# Patient Record
Sex: MALE | Race: NATIVE HAWAIIAN OR OTHER PACIFIC ISLANDER | NOT HISPANIC OR LATINO | Employment: FULL TIME | URBAN - METROPOLITAN AREA
[De-identification: names, ages, dates, MRNs, and addresses within clinical notes are randomized per-mention and may not be internally consistent; named-entity substitution may affect disease eponyms.]

---

## 2018-12-28 ENCOUNTER — APPOINTMENT (OUTPATIENT)
Dept: CT IMAGING | Facility: CLINIC | Age: 58
End: 2018-12-28
Attending: EMERGENCY MEDICINE
Payer: COMMERCIAL

## 2018-12-28 ENCOUNTER — APPOINTMENT (OUTPATIENT)
Dept: GENERAL RADIOLOGY | Facility: CLINIC | Age: 58
End: 2018-12-28
Attending: EMERGENCY MEDICINE
Payer: COMMERCIAL

## 2018-12-28 ENCOUNTER — HOSPITAL ENCOUNTER (EMERGENCY)
Facility: CLINIC | Age: 58
Discharge: HOME | End: 2018-12-28
Attending: EMERGENCY MEDICINE | Admitting: EMERGENCY MEDICINE
Payer: COMMERCIAL

## 2018-12-28 VITALS
HEIGHT: 71 IN | OXYGEN SATURATION: 95 % | BODY MASS INDEX: 44.1 KG/M2 | WEIGHT: 315 LBS | RESPIRATION RATE: 24 BRPM | HEART RATE: 105 BPM | TEMPERATURE: 99.2 F | DIASTOLIC BLOOD PRESSURE: 80 MMHG | SYSTOLIC BLOOD PRESSURE: 115 MMHG

## 2018-12-28 DIAGNOSIS — R20.2 PARESTHESIAS: ICD-10-CM

## 2018-12-28 DIAGNOSIS — N30.00 ACUTE CYSTITIS WITHOUT HEMATURIA: ICD-10-CM

## 2018-12-28 DIAGNOSIS — R06.02 SHORTNESS OF BREATH: ICD-10-CM

## 2018-12-28 DIAGNOSIS — G93.0 ARACHNOID CYST: ICD-10-CM

## 2018-12-28 DIAGNOSIS — R06.00 DYSPNEA, UNSPECIFIED TYPE: ICD-10-CM

## 2018-12-28 LAB
ALBUMIN SERPL-MCNC: 3.5 G/DL (ref 3.4–5)
ALBUMIN UR-MCNC: 30 MG/DL
ALP SERPL-CCNC: 96 U/L (ref 40–150)
ALT SERPL W P-5'-P-CCNC: 41 U/L (ref 0–70)
ANION GAP SERPL CALCULATED.3IONS-SCNC: 9 MMOL/L (ref 3–14)
APPEARANCE UR: ABNORMAL
AST SERPL W P-5'-P-CCNC: 24 U/L (ref 0–45)
BACTERIA #/AREA URNS HPF: ABNORMAL /HPF
BASOPHILS # BLD AUTO: 0 10E9/L (ref 0–0.2)
BASOPHILS NFR BLD AUTO: 0.1 %
BILIRUB SERPL-MCNC: 0.8 MG/DL (ref 0.2–1.3)
BILIRUB UR QL STRIP: NEGATIVE
BUN SERPL-MCNC: 12 MG/DL (ref 7–30)
CALCIUM SERPL-MCNC: 8.6 MG/DL (ref 8.5–10.1)
CHLORIDE SERPL-SCNC: 103 MMOL/L (ref 94–109)
CO2 SERPL-SCNC: 26 MMOL/L (ref 20–32)
COLOR UR AUTO: YELLOW
CREAT SERPL-MCNC: 0.97 MG/DL (ref 0.66–1.25)
DIFFERENTIAL METHOD BLD: ABNORMAL
EOSINOPHIL # BLD AUTO: 0 10E9/L (ref 0–0.7)
EOSINOPHIL NFR BLD AUTO: 0.2 %
ERYTHROCYTE [DISTWIDTH] IN BLOOD BY AUTOMATED COUNT: 13.5 % (ref 10–15)
GFR SERPL CREATININE-BSD FRML MDRD: 86 ML/MIN/{1.73_M2}
GLUCOSE SERPL-MCNC: 95 MG/DL (ref 70–99)
GLUCOSE UR STRIP-MCNC: NEGATIVE MG/DL
HCT VFR BLD AUTO: 41 % (ref 40–53)
HGB BLD-MCNC: 14.4 G/DL (ref 13.3–17.7)
HGB UR QL STRIP: NEGATIVE
IMM GRANULOCYTES # BLD: 0 10E9/L (ref 0–0.4)
IMM GRANULOCYTES NFR BLD: 0.3 %
INTERPRETATION ECG - MUSE: NORMAL
KETONES UR STRIP-MCNC: NEGATIVE MG/DL
LEUKOCYTE ESTERASE UR QL STRIP: ABNORMAL
LYMPHOCYTES # BLD AUTO: 1.8 10E9/L (ref 0.8–5.3)
LYMPHOCYTES NFR BLD AUTO: 11.3 %
MCH RBC QN AUTO: 31.2 PG (ref 26.5–33)
MCHC RBC AUTO-ENTMCNC: 35.1 G/DL (ref 31.5–36.5)
MCV RBC AUTO: 89 FL (ref 78–100)
MONOCYTES # BLD AUTO: 1.3 10E9/L (ref 0–1.3)
MONOCYTES NFR BLD AUTO: 8.1 %
MUCOUS THREADS #/AREA URNS LPF: PRESENT /LPF
NEUTROPHILS # BLD AUTO: 12.6 10E9/L (ref 1.6–8.3)
NEUTROPHILS NFR BLD AUTO: 80 %
NITRATE UR QL: NEGATIVE
NRBC # BLD AUTO: 0 10*3/UL
NRBC BLD AUTO-RTO: 0 /100
PH UR STRIP: 7 PH (ref 5–7)
PLATELET # BLD AUTO: 232 10E9/L (ref 150–450)
POTASSIUM SERPL-SCNC: 3.7 MMOL/L (ref 3.4–5.3)
PROT SERPL-MCNC: 8.1 G/DL (ref 6.8–8.8)
RBC # BLD AUTO: 4.62 10E12/L (ref 4.4–5.9)
RBC #/AREA URNS AUTO: 4 /HPF (ref 0–2)
SODIUM SERPL-SCNC: 138 MMOL/L (ref 133–144)
SOURCE: ABNORMAL
SP GR UR STRIP: 1.02 (ref 1–1.03)
TROPONIN I SERPL-MCNC: <0.015 UG/L (ref 0–0.04)
UROBILINOGEN UR STRIP-MCNC: NORMAL MG/DL (ref 0–2)
WBC # BLD AUTO: 15.7 10E9/L (ref 4–11)
WBC #/AREA URNS AUTO: 104 /HPF (ref 0–5)

## 2018-12-28 PROCEDURE — 84484 ASSAY OF TROPONIN QUANT: CPT | Performed by: EMERGENCY MEDICINE

## 2018-12-28 PROCEDURE — 85025 COMPLETE CBC W/AUTO DIFF WBC: CPT | Performed by: EMERGENCY MEDICINE

## 2018-12-28 PROCEDURE — 25000128 H RX IP 250 OP 636: Performed by: EMERGENCY MEDICINE

## 2018-12-28 PROCEDURE — 70450 CT HEAD/BRAIN W/O DYE: CPT

## 2018-12-28 PROCEDURE — 93005 ELECTROCARDIOGRAM TRACING: CPT

## 2018-12-28 PROCEDURE — 80053 COMPREHEN METABOLIC PANEL: CPT | Performed by: EMERGENCY MEDICINE

## 2018-12-28 PROCEDURE — 71046 X-RAY EXAM CHEST 2 VIEWS: CPT

## 2018-12-28 PROCEDURE — 99285 EMERGENCY DEPT VISIT HI MDM: CPT | Mod: 25

## 2018-12-28 PROCEDURE — 81001 URINALYSIS AUTO W/SCOPE: CPT | Performed by: EMERGENCY MEDICINE

## 2018-12-28 PROCEDURE — 96365 THER/PROPH/DIAG IV INF INIT: CPT

## 2018-12-28 RX ORDER — CEFTRIAXONE 1 G/1
1 INJECTION, POWDER, FOR SOLUTION INTRAMUSCULAR; INTRAVENOUS ONCE
Status: COMPLETED | OUTPATIENT
Start: 2018-12-28 | End: 2018-12-28

## 2018-12-28 RX ORDER — CLOTRIMAZOLE 1 %
CREAM (GRAM) TOPICAL 2 TIMES DAILY
Qty: 45 G | Refills: 0 | Status: SHIPPED | OUTPATIENT
Start: 2018-12-28 | End: 2019-01-12

## 2018-12-28 RX ORDER — CEPHALEXIN 500 MG/1
500 CAPSULE ORAL 2 TIMES DAILY
Qty: 14 CAPSULE | Refills: 0 | Status: SHIPPED | OUTPATIENT
Start: 2018-12-28 | End: 2019-01-04

## 2018-12-28 RX ADMIN — CEFTRIAXONE 1 G: 1 INJECTION, POWDER, FOR SOLUTION INTRAMUSCULAR; INTRAVENOUS at 16:11

## 2018-12-28 SDOH — HEALTH STABILITY: MENTAL HEALTH: HOW OFTEN DO YOU HAVE A DRINK CONTAINING ALCOHOL?: NEVER

## 2018-12-28 ASSESSMENT — MIFFLIN-ST. JEOR: SCORE: 2293.64

## 2018-12-28 ASSESSMENT — ENCOUNTER SYMPTOMS
HEADACHES: 0
DIZZINESS: 1
FEVER: 0
NUMBNESS: 1
LIGHT-HEADEDNESS: 1
SHORTNESS OF BREATH: 1
PALPITATIONS: 1

## 2018-12-28 NOTE — ED PROVIDER NOTES
History     Chief Complaint:  Shortness of Breath     HPI   Som Mathews is a 58 year old male with no known past medical history who presents to the emergency department with palpitations and shortness of breath. The patient just moved here in the last year from [place] and has not yet established primary care here in [place]. The patient has not undergone an annual health check up in some time and is not on any regular medications. He has a history of sleep apnea, but no other diagnosed concerns. More recently, however, the patient is concerned as he has had numbness and tingling into his left hand, which is worse when he plays guitar. The patient is right handed. The tingling also extends intermittently into his lower left foot as well. This morning, then, he awoke feeling short of breath and as though his heart was racing. He did not have chest pain and denies cough. He also notes urinary frequency today without dysuria, hematuria, abdominal/back pain. Throughout the morning, the patient continued to have this sensation, and furthermore felt dizzy and lightheaded. He has not had headaches. The patient notes that he had palpitations several years ago related to stress, and that he has become more anxious lately due to the stress of his work. He otherwise denies depression or sleep disturbance, but has been sleeping for long hours and at odd times. No acute weight changes, but he has been gaining weight steadily over the past 2 years.     Allergies:  No known drug allergies    Medications:    The patient is not currently taking any prescribed medications.    Past Medical History:    Sleep apnea     Past Surgical History:    History reviewed. No pertinent surgical history.    Family History:    History reviewed. No pertinent family history.     Social History:  The patient was accompanied to the ED by his wife.  Smoking Status: Never  Smokeless Tobacco: Never  Alcohol Use: No  Marital Status:  Single [1]  "    Review of Systems   Constitutional: Negative for fever.   Respiratory: Positive for shortness of breath.    Cardiovascular: Positive for palpitations. Negative for chest pain.   Skin:        Chronic \"jock itch\"   Neurological: Positive for dizziness, light-headedness and numbness. Negative for headaches.   All other systems reviewed and are negative.    Physical Exam     Patient Vitals for the past 24 hrs:   BP Temp Temp src Pulse Heart Rate Resp SpO2 Height Weight   12/28/18 1612 -- -- -- -- 111 24 95 % -- --   12/28/18 1611 -- -- -- -- 103 24 96 % -- --   12/28/18 1610 -- -- -- -- 105 27 95 % -- --   12/28/18 1609 -- -- -- -- 104 25 95 % -- --   12/28/18 1608 -- -- -- -- 105 25 95 % -- --   12/28/18 1607 -- -- -- -- 104 22 95 % -- --   12/28/18 1606 -- -- -- -- 105 23 96 % -- --   12/28/18 1605 -- -- -- -- 105 24 94 % -- --   12/28/18 1604 -- -- -- -- 105 23 95 % -- --   12/28/18 1603 -- -- -- -- 104 24 95 % -- --   12/28/18 1602 -- -- -- -- 105 24 94 % -- --   12/28/18 1601 -- -- -- -- 105 27 93 % -- --   12/28/18 1600 -- -- -- -- 108 26 93 % -- --   12/28/18 1559 -- -- -- -- 107 26 94 % -- --   12/28/18 1558 -- -- -- -- 107 20 94 % -- --   12/28/18 1557 -- -- -- -- 112 28 95 % -- --   12/28/18 1556 -- -- -- -- 114 21 96 % -- --   12/28/18 1555 -- -- -- -- 110 27 94 % -- --   12/28/18 1554 -- -- -- -- 110 28 95 % -- --   12/28/18 1553 -- -- -- -- 112 29 96 % -- --   12/28/18 1552 -- -- -- -- 112 16 94 % -- --   12/28/18 1551 -- -- -- -- 109 26 95 % -- --   12/28/18 1550 -- -- -- -- 111 18 93 % -- --   12/28/18 1549 -- -- -- -- 105 24 93 % -- --   12/28/18 1548 -- -- -- -- 108 23 93 % -- --   12/28/18 1547 -- -- -- -- 109 26 94 % -- --   12/28/18 1546 -- -- -- -- 107 24 94 % -- --   12/28/18 1545 -- -- -- -- 109 25 94 % -- --   12/28/18 1544 -- -- -- -- 107 24 94 % -- --   12/28/18 1543 -- -- -- -- 108 25 94 % -- --   12/28/18 1542 -- -- -- -- 109 25 94 % -- --   12/28/18 1541 -- -- -- -- 109 25 94 % -- -- "   12/28/18 1540 -- -- -- -- 107 25 94 % -- --   12/28/18 1539 -- -- -- -- 106 25 94 % -- --   12/28/18 1538 -- -- -- -- 106 24 94 % -- --   12/28/18 1537 -- -- -- -- 110 25 93 % -- --   12/28/18 1536 -- -- -- -- 105 24 94 % -- --   12/28/18 1535 -- -- -- -- 105 24 93 % -- --   12/28/18 1534 -- -- -- -- 105 23 94 % -- --   12/28/18 1533 -- -- -- -- 111 26 94 % -- --   12/28/18 1532 -- -- -- -- 105 24 94 % -- --   12/28/18 1531 -- -- -- -- 108 25 93 % -- --   12/28/18 1530 -- -- -- -- 106 25 94 % -- --   12/28/18 1529 -- -- -- -- 107 25 94 % -- --   12/28/18 1528 -- -- -- -- 108 27 94 % -- --   12/28/18 1527 -- -- -- -- 107 26 95 % -- --   12/28/18 1526 -- -- -- -- 108 27 95 % -- --   12/28/18 1525 -- -- -- -- 113 (!) 36 -- -- --   12/28/18 1413 -- -- -- -- 104 23 96 % -- --   12/28/18 1412 -- -- -- -- 104 23 95 % -- --   12/28/18 1411 -- -- -- -- 104 23 96 % -- --   12/28/18 1410 -- -- -- -- 103 24 97 % -- --   12/28/18 1409 -- -- -- -- 105 21 95 % -- --   12/28/18 1408 -- -- -- -- 108 24 95 % -- --   12/28/18 1407 -- -- -- -- 105 24 95 % -- --   12/28/18 1406 -- -- -- -- 106 24 95 % -- --   12/28/18 1405 -- -- -- -- 105 22 95 % -- --   12/28/18 1404 -- -- -- -- 107 23 95 % -- --   12/28/18 1403 -- -- -- -- 106 24 96 % -- --   12/28/18 1402 -- -- -- -- 109 16 97 % -- --   12/28/18 1401 -- -- -- -- 110 (!) 34 96 % -- --   12/28/18 1400 115/80 -- -- 105 104 25 98 % -- --   12/28/18 1359 -- -- -- -- 112 19 96 % -- --   12/28/18 1358 -- -- -- -- 104 22 96 % -- --   12/28/18 1357 -- -- -- -- 106 22 96 % -- --   12/28/18 1356 -- -- -- -- 110 27 95 % -- --   12/28/18 1355 -- -- -- -- 107 25 95 % -- --   12/28/18 1354 122/74 -- -- 103 102 25 95 % -- --   12/28/18 1353 -- -- -- -- 104 22 97 % -- --   12/28/18 1352 -- -- -- -- 105 21 95 % -- --   12/28/18 1351 -- -- -- -- 107 25 96 % -- --   12/28/18 1350 -- -- -- -- 105 23 95 % -- --   12/28/18 1349 -- -- -- -- 104 26 96 % -- --   12/28/18 1348 -- -- -- -- 105 24 96 % -- --  "  12/28/18 1347 -- -- -- -- 105 26 96 % -- --   12/28/18 1346 -- -- -- -- 105 26 95 % -- --   12/28/18 1345 -- -- -- -- 106 19 95 % -- --   12/28/18 1344 -- -- -- -- 105 23 97 % -- --   12/28/18 1343 -- -- -- -- 107 20 96 % -- --   12/28/18 1342 -- -- -- -- 105 24 96 % -- --   12/28/18 1341 118/81 99.2  F (37.3  C) Oral 104 105 20 96 % 1.803 m (5' 11\") 145.2 kg (320 lb)   12/28/18 1340 -- -- -- -- 105 13 96 % -- --   12/28/18 1339 -- -- -- -- 105 18 96 % -- --   12/28/18 1338 -- -- -- -- 106 27 95 % -- --   12/28/18 1337 -- -- -- -- 108 22 96 % -- --   12/28/18 1336 -- -- -- -- -- 27 96 % -- --   12/28/18 1335 -- -- -- -- -- -- 97 % -- --   12/28/18 1334 -- -- -- -- -- -- 97 % -- --   12/28/18 1333 118/81 -- -- 106 -- -- -- -- --       Physical Exam    Physical Exam   Constitutional:  Patient is oriented to person, place, and time. They appear well-developed and well-nourished.   HENT:   Mouth/Throat:   Oropharynx is clear and moist.   Eyes:    Conjunctivae normal and EOM are normal. Pupils are equal, round, and reactive to light.   Neck:    Normal range of motion.   Cardiovascular: Normal rate, regular rhythm and normal heart sounds.  Exam reveals no gallop and no friction rub.  No murmur heard.  Pulmonary/Chest:  Effort normal and breath sounds normal. Patient has no wheezes. Patient has no rales.   Abdominal:   High BMI. Soft. Bowel sounds are normal. Patient exhibits no mass. There is no tenderness. There is no rebound and no guarding.   Musculoskeletal:  Normal range of motion. Patient exhibits no edema.   Neurological:   Patient is alert and oriented to person, place, and time. Patient has normal strength. No cranial nerve deficit or sensory deficit. GCS 15. No sensory deficits to soft touch.  Skin:   Skin is warm and dry. No rash noted. No erythema. hyperpigmentation in the groin area, no erythema, pustules, or cellulitis.  Psychiatric:   Patient has a normal mood and affect. Patient's behavior is normal. " Judgment and thought content normal.     Emergency Department Course     Imaging:  Radiology findings were communicated with the patient who voiced understanding of the findings.    Head CT w/o Contrast:  IMPRESSION: Arachnoid cyst above the left superior frontal gyrus.  Otherwise normal CT scan of the head.      FAHEEM HUGHES MD    Chest XR, PA & LAT:  IMPRESSION: No acute disease.    JUDITH ENCARNACION MD    Laboratory:  Laboratory findings were communicated with the patient who voiced understanding of the findings.    CBC: WBC 15.7 (H), o/w WNL (HGB 14.4, )  CMP: (Creatinine 0.97)    Troponin (Collected 1345): <0.015    UA: Albumin 30, Leukocyte esterase large,  (H), RBC 4 (H), bacteria moderate, mucous present, o/w Negative    Interventions:  1611 - Rocephin 1 g vial to  mL IV    Emergency Department Course:  Nursing notes and vitals reviewed.    1452: I performed an exam of the patient as documented above.     Findings and plan explained to the Patient. Patient discharged home with instructions regarding supportive care, medications, and reasons to return. The importance of close follow-up was reviewed.     Impression & Plan      Medical Decision Making:  Paulino Mathews is a 58-year-old gentleman presenting with some ongoing symptoms that have been going on for a month or 2.  He has had some dyspnea but no fevers or cough.  He also has experienced palpitations and some numbness in his left fingertips worse when he plays to place guitar and some tingling in his left second and third toes.  He has no focal weakness.  He has no headache.  His physical exam showed mild tachycardia but otherwise no abdominal pain, CVA tenderness, crackles, respiratory distress, cellulitis.  He did comes to complain of some chronic jock itch for years but there is no acute cellulitis, Clari's, scrotal swelling.     Diagnostic eval was performed.  EKG shows he has mild tachycardia but no evidence of ischemia.  His  cardiac enzymes within normal limits.  Chest x-ray shows no evidence of pneumonia or CHF, masses, or free air.  I did a CT of his head due to his complaints of paresthesias there is an arachnoid cyst there he has never had imaging of his head before.  I suspect that this is more of an incidental finding as these are quite benign. I suspect his paresthesias are due to playing guEchographr.      Urine analysis was performed is he complains of some urinary frequency today.  This does show a urinary tract infection for which he received a dose of Rocephin here.  He has no CVA tenderness, abdominal pain, suprapubic pain, or flank pain.  I doubt therefore pyelonephritis or kidney stone.  With no abdominal pain I doubt any intra-abdominal infection.    At this time I feel he is safe for discharge he is able to tolerate p.o. I see no acute findings for his shortness of breath or palpitations.  Specifically no arrhythmias.  I doubt PE, pericarditis or myocarditis given lack of symptomatology and diagnostic test today.  I do think he needs to establish care, and needs further evaluation of his symptoms, but not emergently so.  I write him for Keflex, clotrimazole for his chronic candidiasis, he will follow-up with primary care as he has multiple chronic issues, referral was given.    Diagnosis:    ICD-10-CM    1. Dyspnea, unspecified type R06.00    2. Paresthesias R20.2    3. Acute cystitis without hematuria N30.00    4. Shortness of breath R06.02    5. Arachnoid cyst G93.0        Disposition:  discharged to home    Discharge Medications:     Medication List      Started    cephALEXin 500 MG capsule  Commonly known as:  KEFLEX  500 mg, Oral, 2 TIMES DAILY     clotrimazole 1 % external cream  Commonly known as:  LOTRIMIN  Topical, 2 TIMES DAILY            Consuelo Davidson  12/28/2018    EMERGENCY DEPARTMENT  I, Consuelo Davidson, am serving as a scribe at 2:54 PM on 12/28/2018 to document services personally performed by Froy  Padma Meyer MD based on my observations and the provider's statements to me.       Padma Mcduffie MD  01/02/19 [id number]

## 2018-12-28 NOTE — ED NOTES
Bed: ED10  Expected date:   Expected time:   Means of arrival:   Comments:  431  58 M weakness/sob/L sided numbness  1321

## 2018-12-28 NOTE — ED AVS SNAPSHOT
Emergency Department  [address] [place] [address]  Phone:  [phone]  Fax:  [phone]                                    Som Mathews   MRN: [id number]    Department:   Emergency Department   Date of Visit:  12/28/2018           After Visit Summary Signature Page    I have received my discharge instructions, and my questions have been answered. I have discussed any challenges I see with this plan with the nurse or doctor.    ..........................................................................................................................................  Patient/Patient Representative Signature      ..........................................................................................................................................  Patient Representative Print Name and Relationship to Patient    ..................................................               ................................................  Date                                   Time    ..........................................................................................................................................  Reviewed by Signature/Title    ...................................................              ..............................................  Date                                               Time          22EPIC Rev 08/18

## 2019-03-18 ENCOUNTER — TELEPHONE (OUTPATIENT)
Dept: OTHER | Facility: CLINIC | Age: 59
End: 2019-03-18

## 2019-09-03 ENCOUNTER — OFFICE VISIT - HEALTHEAST (OUTPATIENT)
Dept: FAMILY MEDICINE | Facility: CLINIC | Age: 59
End: 2019-09-03

## 2019-09-03 DIAGNOSIS — R20.2 INTERMITTENT TINGLING SENSATION OF LEFT HAND AND FOOT: ICD-10-CM

## 2019-09-03 DIAGNOSIS — Z13.1 ENCOUNTER FOR SCREENING FOR DIABETES MELLITUS: ICD-10-CM

## 2019-09-03 DIAGNOSIS — H91.92 HEARING LOSS OF LEFT EAR, UNSPECIFIED HEARING LOSS TYPE: ICD-10-CM

## 2019-09-03 DIAGNOSIS — K42.9 UMBILICAL HERNIA WITHOUT OBSTRUCTION AND WITHOUT GANGRENE: ICD-10-CM

## 2019-09-03 DIAGNOSIS — Z12.11 SCREEN FOR COLON CANCER: ICD-10-CM

## 2019-09-03 DIAGNOSIS — M76.62 ACHILLES TENDINITIS OF LEFT LOWER EXTREMITY: ICD-10-CM

## 2019-09-03 DIAGNOSIS — Z80.0 FAMILY HISTORY OF COLON CANCER: ICD-10-CM

## 2019-09-03 DIAGNOSIS — E66.01 OBESITY, CLASS III, BMI 40-49.9 (MORBID OBESITY) (H): ICD-10-CM

## 2019-09-03 DIAGNOSIS — Z13.220 ENCOUNTER FOR SCREENING FOR LIPOID DISORDERS: ICD-10-CM

## 2019-09-03 DIAGNOSIS — Z00.01 ENCOUNTER FOR GENERAL ADULT MEDICAL EXAMINATION WITH ABNORMAL FINDINGS: ICD-10-CM

## 2019-09-03 LAB
CHOLEST SERPL-MCNC: 178 MG/DL
FASTING STATUS PATIENT QL REPORTED: YES
FASTING STATUS PATIENT QL REPORTED: YES
GLUCOSE BLD-MCNC: 81 MG/DL (ref 70–99)
HDLC SERPL-MCNC: 48 MG/DL
HIV 1+2 AB+HIV1 P24 AG SERPL QL IA: NEGATIVE
LDLC SERPL CALC-MCNC: 112 MG/DL
TRIGL SERPL-MCNC: 90 MG/DL

## 2019-09-03 ASSESSMENT — MIFFLIN-ST. JEOR: SCORE: 2234.02

## 2019-09-04 LAB — HCV AB SERPL QL IA: NEGATIVE

## 2019-11-11 ENCOUNTER — RECORDS - HEALTHEAST (OUTPATIENT)
Dept: ADMINISTRATIVE | Facility: OTHER | Age: 59
End: 2019-11-11

## 2020-07-22 ENCOUNTER — HOSPITAL ENCOUNTER (EMERGENCY)
Facility: CLINIC | Age: 60
Discharge: HOME | End: 2020-07-22
Attending: INTERNAL MEDICINE | Admitting: INTERNAL MEDICINE
Payer: COMMERCIAL

## 2020-07-22 ENCOUNTER — RECORDS - HEALTHEAST (OUTPATIENT)
Dept: ADMINISTRATIVE | Facility: OTHER | Age: 60
End: 2020-07-22

## 2020-07-22 VITALS
DIASTOLIC BLOOD PRESSURE: 76 MMHG | OXYGEN SATURATION: 94 % | RESPIRATION RATE: 16 BRPM | TEMPERATURE: 98 F | SYSTOLIC BLOOD PRESSURE: 120 MMHG

## 2020-07-22 DIAGNOSIS — S61.215A LACERATION OF LEFT RING FINGER WITHOUT FOREIGN BODY, NAIL DAMAGE STATUS UNSPECIFIED, INITIAL ENCOUNTER: ICD-10-CM

## 2020-07-22 PROCEDURE — 99282 EMERGENCY DEPT VISIT SF MDM: CPT | Mod: Z6 | Performed by: INTERNAL MEDICINE

## 2020-07-22 PROCEDURE — 99282 EMERGENCY DEPT VISIT SF MDM: CPT | Performed by: INTERNAL MEDICINE

## 2020-07-22 ASSESSMENT — ENCOUNTER SYMPTOMS
ABDOMINAL PAIN: 0
WOUND: 1
SHORTNESS OF BREATH: 0
FEVER: 0

## 2020-07-22 NOTE — ED AVS SNAPSHOT
[facility], [place], Emergency Department  [address]  [facility] [address]  Phone:  [phone]  Fax:  [phone]                                    Som Mathews   MRN: [id number]    Department:  [facility], Emergency Department   Date of Visit:  7/22/2020           After Visit Summary Signature Page    I have received my discharge instructions, and my questions have been answered. I have discussed any challenges I see with this plan with the nurse or doctor.    ..........................................................................................................................................  Patient/Patient Representative Signature      ..........................................................................................................................................  Patient Representative Print Name and Relationship to Patient    ..................................................               ................................................  Date                                   Time    ..........................................................................................................................................  Reviewed by Signature/Title    ...................................................              ..............................................  Date                                               Time          22EPIC Rev 08/18

## 2020-07-23 NOTE — ED NOTES
Pt reports that he was carrying a metal shelf w/ some rust on it when it slipped out of his hands and sliced his finger. Pt is unsure of his TDAP being up to date.

## 2020-07-23 NOTE — ED PROVIDER NOTES
[facility] EMERGENCY DEPARTMENT ([facility])     July 22, 2020  History     Chief Complaint   Patient presents with     Laceration     pt cut finger on dae bookshelf about an hour and half ago. doesn't remember when last tetanus shot was.     HPI  Som Mathews is a 59 year old male with a medical history significant for sleep apnea who presents the ED today complaining of a finger laceration.  Patient reports he cut his left ring finger on metal an hour and a half ago.  Patient denies any other medical concerns.    I have reviewed the Medications, Allergies, Past Medical and Surgical History, and Social History in the [facility] system.  PAST MEDICAL HISTORY:   Past Medical History:   Diagnosis Date     Sleep apnea        PAST SURGICAL HISTORY:   Past Surgical History:   Procedure Laterality Date     ORTHOPEDIC SURGERY      orthoscopic knee       Past medical history, past surgical history, medications, and allergies were reviewed with the patient. Additional pertinent items: None    FAMILY HISTORY: No family history on file.    SOCIAL HISTORY:   Social History     Tobacco Use     Smoking status: Never Smoker   Substance Use Topics     Alcohol use: No     Frequency: Never     Social history was reviewed with the patient. Additional pertinent items: None      There are no discharge medications for this patient.       No Known Allergies     Review of Systems   Constitutional: Negative for fever.   Respiratory: Negative for shortness of breath.    Cardiovascular: Negative for chest pain.   Gastrointestinal: Negative for abdominal pain.   Skin: Positive for wound (Laceration, left ring finger).   All other systems reviewed and are negative.    A complete review of systems was performed with pertinent positives and negatives noted in the HPI, and all other systems negative.    Physical Exam   BP: 120/76  Heart Rate: 90  Temp: 98  F (36.7  C)  Resp: 16  SpO2: 94 %      Physical Exam  Musculoskeletal:      Left  hand: He exhibits laceration. He exhibits normal range of motion, no tenderness, no bony tenderness, normal two-point discrimination, normal capillary refill, no deformity and no swelling. Normal sensation noted. Normal strength noted.        Hands:          ED Course   9:50 PM  The patient was seen and examined by Erick Clemons MD in Room ED09.        Procedures                     No results found for this or any previous visit (from the past 24 hour(s)).  Medications - No data to display          Assessments & Plan (with Medical Decision Making)    Left ring finger superficial lac, s/p irrigation and tube gauze, tetanus up date, discharge and follow up with his PMD prn.         I have reviewed the nursing notes.    I have reviewed the findings, diagnosis, plan and need for follow up with the patient.    There are no discharge medications for this patient.      Final diagnoses:   Laceration of left ring finger without foreign body, nail damage status unspecified, initial encounter   IKashif, am serving as a trained medical scribe to document services personally performed by Erick Clemons Md, MD, based on the provider's statements to me.     IErick Md, MD, was physically present and have reviewed and verified the accuracy of this note documented by Kashif Montero.    7/22/2020   [facility], [place], EMERGENCY DEPARTMENT     Erick Clemons MD  07/22/20 [id number]

## 2020-07-23 NOTE — DISCHARGE INSTRUCTIONS
Small or Superficial Laceration: Not Stitched  A laceration is a cut through the skin. A laceration requires stitches or staples if it is deep or spread open. A small laceration often doesn't require stitches.   You may need a tetanus shot. This may be given if you have no record of this vaccination and the object that caused the cut may lead to tetanus  Home care    Your healthcare provider may prescribe an antibiotic. This is to help prevent infection. Follow all instructions for taking this medicine. Take the medicine every day until it is gone or you are told to stop. You should not have any left over.    The healthcare provider may prescribe medicines for pain. Follow instructions for taking them.    Follow the healthcare provider s instructions on how to care for the cut.    Wash your hands with soap and warm water before and after caring for cut. This helps prevent infection.    Keep the wound clean and dry. If a bandage was applied and it becomes wet or dirty, replace it. Otherwise, leave it in place for the first 24 hours, then change it once a day or as directed.    Clean the wound daily:  ? After removing any bandage, wash the area with soap and water. Use a wet cotton swab to loosen and remove any blood or crust that forms.  ? After cleaning, keep the wound clean and dry. Talk with your healthcare provider before applying any antibiotic ointment to the wound. Reapply a fresh bandage.    You may remove the bandage to shower as usual after the first 24 hours, but don't soak the area in water (no tub baths or swimming) for the next 5 days.    If the area gets wet, gently pat it dry with a clean cloth. Replace the wet bandage with a dry one.    Don't do activities that may reinjure your wound.    Don't scratch, rub, or pick at the area.    Check the wound daily for signs of infection listed below.    Follow-up care  Follow up with your healthcare provider, or as advised.  When to seek medical advice  Call  your healthcare provider right away if any of these occur:    Wound bleeding not controlled by direct pressure    Signs of infection, including increasing pain in the wound, increasing wound redness or swelling, or pus or bad odor coming from the wound    Fever of 100.4 F (38 C) or higher, or as directed by your healthcare provider    Wound edges reopen    Wound changes colors    Numbness around the wound     Decreased movement around the injured area  Date Last Reviewed: 7/1/2017 2000-2019 [employer]. [address] [place] [address]. All rights reserved. This information is not intended as a substitute for professional medical care. Always follow your healthcare professional's instructions.      Please make an appointment to follow up with Your Primary Care Provider in 5-10 days if you have any concerns.

## 2020-10-20 ENCOUNTER — OFFICE VISIT - HEALTHEAST (OUTPATIENT)
Dept: FAMILY MEDICINE | Facility: CLINIC | Age: 60
End: 2020-10-20

## 2020-10-20 DIAGNOSIS — R20.2 PARESTHESIAS: ICD-10-CM

## 2020-10-20 DIAGNOSIS — Z00.01 ENCOUNTER FOR GENERAL ADULT MEDICAL EXAMINATION WITH ABNORMAL FINDINGS: ICD-10-CM

## 2020-10-20 DIAGNOSIS — Z23 NEED FOR VACCINATION: ICD-10-CM

## 2020-10-20 DIAGNOSIS — E66.01 OBESITY, CLASS III, BMI 40-49.9 (MORBID OBESITY) (H): ICD-10-CM

## 2020-10-20 DIAGNOSIS — R06.09 DYSPNEA ON EXERTION: ICD-10-CM

## 2020-10-20 ASSESSMENT — MIFFLIN-ST. JEOR: SCORE: 2288.63

## 2020-11-23 ENCOUNTER — COMMUNICATION - HEALTHEAST (OUTPATIENT)
Dept: FAMILY MEDICINE | Facility: CLINIC | Age: 60
End: 2020-11-23

## 2020-11-23 ENCOUNTER — OFFICE VISIT - HEALTHEAST (OUTPATIENT)
Dept: FAMILY MEDICINE | Facility: CLINIC | Age: 60
End: 2020-11-23

## 2020-11-23 DIAGNOSIS — G47.30 SLEEP APNEA, UNSPECIFIED TYPE: ICD-10-CM

## 2021-01-03 ENCOUNTER — HEALTH MAINTENANCE LETTER (OUTPATIENT)
Age: 61
End: 2021-01-03

## 2021-03-02 ENCOUNTER — OFFICE VISIT - HEALTHEAST (OUTPATIENT)
Dept: FAMILY MEDICINE | Facility: CLINIC | Age: 61
End: 2021-03-02

## 2021-03-02 DIAGNOSIS — R30.0 DYSURIA: ICD-10-CM

## 2021-03-02 DIAGNOSIS — N30.00 ACUTE CYSTITIS WITHOUT HEMATURIA: ICD-10-CM

## 2021-03-02 LAB
ALBUMIN UR-MCNC: ABNORMAL MG/DL
APPEARANCE UR: ABNORMAL
BACTERIA #/AREA URNS HPF: ABNORMAL HPF
BILIRUB UR QL STRIP: NEGATIVE
COLOR UR AUTO: YELLOW
GLUCOSE UR STRIP-MCNC: NEGATIVE MG/DL
HGB UR QL STRIP: ABNORMAL
KETONES UR STRIP-MCNC: NEGATIVE MG/DL
LEUKOCYTE ESTERASE UR QL STRIP: ABNORMAL
NITRATE UR QL: NEGATIVE
PH UR STRIP: 6 [PH] (ref 5–8)
RBC #/AREA URNS AUTO: ABNORMAL HPF
SP GR UR STRIP: 1.02 (ref 1–1.03)
SQUAMOUS #/AREA URNS AUTO: ABNORMAL LPF
UROBILINOGEN UR STRIP-ACNC: ABNORMAL
WBC #/AREA URNS AUTO: >100 HPF

## 2021-03-04 ENCOUNTER — AMBULATORY - HEALTHEAST (OUTPATIENT)
Dept: FAMILY MEDICINE | Facility: CLINIC | Age: 61
End: 2021-03-04

## 2021-03-04 ENCOUNTER — COMMUNICATION - HEALTHEAST (OUTPATIENT)
Dept: FAMILY MEDICINE | Facility: CLINIC | Age: 61
End: 2021-03-04

## 2021-03-04 DIAGNOSIS — N39.0 URINARY TRACT INFECTION IN MALE: ICD-10-CM

## 2021-03-04 LAB — BACTERIA SPEC CULT: ABNORMAL

## 2021-04-07 ENCOUNTER — IMMUNIZATION (OUTPATIENT)
Dept: NURSING | Facility: CLINIC | Age: 61
End: 2021-04-07
Payer: COMMERCIAL

## 2021-04-07 PROCEDURE — 91300 PR COVID VAC PFIZER DIL RECON 30 MCG/0.3 ML IM: CPT

## 2021-04-07 PROCEDURE — 0001A PR COVID VAC PFIZER DIL RECON 30 MCG/0.3 ML IM: CPT

## 2021-04-28 ENCOUNTER — IMMUNIZATION (OUTPATIENT)
Dept: NURSING | Facility: CLINIC | Age: 61
End: 2021-04-28
Attending: INTERNAL MEDICINE
Payer: COMMERCIAL

## 2021-04-28 PROCEDURE — 0002A PR COVID VAC PFIZER DIL RECON 30 MCG/0.3 ML IM: CPT

## 2021-04-28 PROCEDURE — 91300 PR COVID VAC PFIZER DIL RECON 30 MCG/0.3 ML IM: CPT

## 2021-05-27 VITALS
SYSTOLIC BLOOD PRESSURE: 128 MMHG | RESPIRATION RATE: 16 BRPM | OXYGEN SATURATION: 97 % | HEART RATE: 89 BPM | DIASTOLIC BLOOD PRESSURE: 85 MMHG | TEMPERATURE: 97.9 F

## 2021-05-30 ENCOUNTER — APPOINTMENT (OUTPATIENT)
Dept: GENERAL RADIOLOGY | Facility: CLINIC | Age: 61
End: 2021-05-30
Attending: EMERGENCY MEDICINE
Payer: COMMERCIAL

## 2021-05-30 ENCOUNTER — HOSPITAL ENCOUNTER (EMERGENCY)
Facility: CLINIC | Age: 61
Discharge: HOME | End: 2021-05-30
Attending: EMERGENCY MEDICINE | Admitting: EMERGENCY MEDICINE
Payer: COMMERCIAL

## 2021-05-30 VITALS
SYSTOLIC BLOOD PRESSURE: 131 MMHG | OXYGEN SATURATION: 96 % | BODY MASS INDEX: 43.4 KG/M2 | TEMPERATURE: 99.3 F | DIASTOLIC BLOOD PRESSURE: 80 MMHG | HEART RATE: 109 BPM | HEIGHT: 71 IN | RESPIRATION RATE: 26 BRPM | WEIGHT: 310 LBS

## 2021-05-30 DIAGNOSIS — R06.2 WHEEZING: ICD-10-CM

## 2021-05-30 DIAGNOSIS — J06.9 VIRAL UPPER RESPIRATORY INFECTION: ICD-10-CM

## 2021-05-30 LAB
ALBUMIN SERPL-MCNC: 3 G/DL (ref 3.4–5)
ALBUMIN UR-MCNC: 20 MG/DL
ALP SERPL-CCNC: 139 U/L (ref 40–150)
ALT SERPL W P-5'-P-CCNC: 190 U/L (ref 0–70)
ANION GAP SERPL CALCULATED.3IONS-SCNC: 8 MMOL/L (ref 3–14)
APPEARANCE UR: CLEAR
AST SERPL W P-5'-P-CCNC: 129 U/L (ref 0–45)
BASOPHILS # BLD AUTO: 0 10E9/L (ref 0–0.2)
BASOPHILS NFR BLD AUTO: 0.2 %
BILIRUB SERPL-MCNC: 1.4 MG/DL (ref 0.2–1.3)
BILIRUB UR QL STRIP: NEGATIVE
BUN SERPL-MCNC: 9 MG/DL (ref 7–30)
CALCIUM SERPL-MCNC: 8.3 MG/DL (ref 8.5–10.1)
CHLORIDE SERPL-SCNC: 104 MMOL/L (ref 94–109)
CO2 SERPL-SCNC: 25 MMOL/L (ref 20–32)
COLOR UR AUTO: YELLOW
CREAT SERPL-MCNC: 1.01 MG/DL (ref 0.66–1.25)
DIFFERENTIAL METHOD BLD: ABNORMAL
EOSINOPHIL # BLD AUTO: 0.2 10E9/L (ref 0–0.7)
EOSINOPHIL NFR BLD AUTO: 1.8 %
ERYTHROCYTE [DISTWIDTH] IN BLOOD BY AUTOMATED COUNT: 13.2 % (ref 10–15)
GFR SERPL CREATININE-BSD FRML MDRD: 80 ML/MIN/{1.73_M2}
GLUCOSE SERPL-MCNC: 149 MG/DL (ref 70–99)
GLUCOSE UR STRIP-MCNC: NEGATIVE MG/DL
HCT VFR BLD AUTO: 40.6 % (ref 40–53)
HGB BLD-MCNC: 13.6 G/DL (ref 13.3–17.7)
HGB UR QL STRIP: NEGATIVE
IMM GRANULOCYTES # BLD: 0 10E9/L (ref 0–0.4)
IMM GRANULOCYTES NFR BLD: 0.3 %
KETONES UR STRIP-MCNC: NEGATIVE MG/DL
LABORATORY COMMENT REPORT: NORMAL
LEUKOCYTE ESTERASE UR QL STRIP: NEGATIVE
LYMPHOCYTES # BLD AUTO: 0.8 10E9/L (ref 0.8–5.3)
LYMPHOCYTES NFR BLD AUTO: 6.6 %
MCH RBC QN AUTO: 31 PG (ref 26.5–33)
MCHC RBC AUTO-ENTMCNC: 33.5 G/DL (ref 31.5–36.5)
MCV RBC AUTO: 93 FL (ref 78–100)
MONOCYTES # BLD AUTO: 0.8 10E9/L (ref 0–1.3)
MONOCYTES NFR BLD AUTO: 6.7 %
MUCOUS THREADS #/AREA URNS LPF: PRESENT /LPF
NEUTROPHILS # BLD AUTO: 10.6 10E9/L (ref 1.6–8.3)
NEUTROPHILS NFR BLD AUTO: 84.4 %
NITRATE UR QL: NEGATIVE
NRBC # BLD AUTO: 0 10*3/UL
NRBC BLD AUTO-RTO: 0 /100
PH UR STRIP: 6 PH (ref 5–7)
PLATELET # BLD AUTO: 242 10E9/L (ref 150–450)
POTASSIUM SERPL-SCNC: 3.6 MMOL/L (ref 3.4–5.3)
PROCALCITONIN SERPL-MCNC: 0.07 NG/ML
PROT SERPL-MCNC: 7.6 G/DL (ref 6.8–8.8)
RBC # BLD AUTO: 4.39 10E12/L (ref 4.4–5.9)
RBC #/AREA URNS AUTO: 1 /HPF (ref 0–2)
SARS-COV-2 RNA RESP QL NAA+PROBE: NEGATIVE
SODIUM SERPL-SCNC: 137 MMOL/L (ref 133–144)
SOURCE: ABNORMAL
SP GR UR STRIP: 1.01 (ref 1–1.03)
SPECIMEN SOURCE: NORMAL
SQUAMOUS #/AREA URNS AUTO: <1 /HPF (ref 0–1)
UROBILINOGEN UR STRIP-MCNC: 0 MG/DL (ref 0–2)
WBC # BLD AUTO: 12.5 10E9/L (ref 4–11)
WBC #/AREA URNS AUTO: 2 /HPF (ref 0–5)

## 2021-05-30 PROCEDURE — 71045 X-RAY EXAM CHEST 1 VIEW: CPT

## 2021-05-30 PROCEDURE — 81001 URINALYSIS AUTO W/SCOPE: CPT | Performed by: EMERGENCY MEDICINE

## 2021-05-30 PROCEDURE — 250N000009 HC RX 250: Performed by: EMERGENCY MEDICINE

## 2021-05-30 PROCEDURE — 250N000012 HC RX MED GY IP 250 OP 636 PS 637: Performed by: EMERGENCY MEDICINE

## 2021-05-30 PROCEDURE — 999N000157 HC STATISTIC RCP TIME EA 10 MIN

## 2021-05-30 PROCEDURE — 85025 COMPLETE CBC W/AUTO DIFF WBC: CPT | Performed by: EMERGENCY MEDICINE

## 2021-05-30 PROCEDURE — 94640 AIRWAY INHALATION TREATMENT: CPT

## 2021-05-30 PROCEDURE — C9803 HOPD COVID-19 SPEC COLLECT: HCPCS

## 2021-05-30 PROCEDURE — 80053 COMPREHEN METABOLIC PANEL: CPT | Performed by: EMERGENCY MEDICINE

## 2021-05-30 PROCEDURE — 87635 SARS-COV-2 COVID-19 AMP PRB: CPT | Performed by: EMERGENCY MEDICINE

## 2021-05-30 PROCEDURE — 84145 PROCALCITONIN (PCT): CPT | Performed by: EMERGENCY MEDICINE

## 2021-05-30 PROCEDURE — 99284 EMERGENCY DEPT VISIT MOD MDM: CPT | Mod: 25

## 2021-05-30 RX ORDER — PREDNISONE 20 MG/1
40 TABLET ORAL ONCE
Status: COMPLETED | OUTPATIENT
Start: 2021-05-30 | End: 2021-05-30

## 2021-05-30 RX ORDER — PREDNISONE 20 MG/1
TABLET ORAL
Qty: 10 TABLET | Refills: 0 | Status: SHIPPED | OUTPATIENT
Start: 2021-05-30 | End: 2021-09-08

## 2021-05-30 RX ORDER — IPRATROPIUM BROMIDE AND ALBUTEROL SULFATE 2.5; .5 MG/3ML; MG/3ML
3 SOLUTION RESPIRATORY (INHALATION) ONCE
Status: COMPLETED | OUTPATIENT
Start: 2021-05-30 | End: 2021-05-30

## 2021-05-30 RX ORDER — ALBUTEROL SULFATE 90 UG/1
2 AEROSOL, METERED RESPIRATORY (INHALATION) EVERY 6 HOURS PRN
Qty: 8.5 G | Refills: 0 | Status: SHIPPED | OUTPATIENT
Start: 2021-05-30 | End: 2021-09-08

## 2021-05-30 RX ADMIN — IPRATROPIUM BROMIDE AND ALBUTEROL SULFATE 3 ML: .5; 3 SOLUTION RESPIRATORY (INHALATION) at 12:56

## 2021-05-30 RX ADMIN — PREDNISONE 40 MG: 20 TABLET ORAL at 14:28

## 2021-05-30 ASSESSMENT — ENCOUNTER SYMPTOMS
DYSURIA: 0
HEMATURIA: 0
MYALGIAS: 1
FEVER: 1
WEAKNESS: 1
FREQUENCY: 1
SHORTNESS OF BREATH: 1
WHEEZING: 1
FATIGUE: 1

## 2021-05-30 ASSESSMENT — MIFFLIN-ST. JEOR: SCORE: 2238.28

## 2021-05-30 NOTE — DISCHARGE INSTRUCTIONS
Shortness of breath is likely related to reactive inflammation of the lungs from a virus.  So far x-ray and lab work is unremarkable.  Please start albuterol when needed.  Start daily prednisone as an anti-inflammatory.  Okay to use cough medication as needed.  The cause of urinary symptoms is unclear there is no signs of urine infection or blood in the urine.  If you have ongoing symptoms of urinary frequency please discuss further evaluation with your primary care physician.  
Left arm;

## 2021-05-30 NOTE — ED PROVIDER NOTES
History   Chief Complaint:  Shortness of Breath, Fever, and Urinary Frequency     HPI   Som Mathews is a 60 year old male who presents to the ED for an evaluation of a myriad of symptoms including fever, shortness of breath, wheezing, generalized body aches, increased urinary frequency, and fatigue/weakness. The patient states he has been sick for the past couple of days. Last night, his fever increased to 102.5 overnight. His urinary frequency also started then. In addition to his symptoms, he also endorses an abscess near his right axilla that has been there for a couple of days. He otherwise denies chest pain, dysuria, or hematuria. He denies a history of asthma, blood clots, or recent surgeries. He did take a dose of Tylenol last night. He is not currently taking any prescribed medications. He notes that one family members has not been immunized yet. He received his second COVID-19 shot around 3 weeks ago.     Review of Systems   Constitutional: Positive for fatigue and fever.   Respiratory: Positive for shortness of breath and wheezing.    Cardiovascular: Negative for chest pain.   Genitourinary: Positive for frequency. Negative for dysuria and hematuria.   Musculoskeletal: Positive for myalgias (generalized body aches).   Skin:        Abscess near right axilla   Neurological: Positive for weakness.   All other systems reviewed and are negative.    Allergies:  The patient has no known allergies.     Medications:  The patient is not currently taking any prescribed medications.    Past Medical History:    Sleep apnea  Umbilical hernia    Past Surgical History:    Bilateral meniscectomy    Family History:    Colon cancer  Type I diabetes mellitus    Social History:  The patient presented alone.     Physical Exam     Patient Vitals for the past 24 hrs:   BP Temp Temp src Pulse Resp SpO2 Height Weight   05/30/21 1446 -- -- -- -- -- 96 % -- --   05/30/21 1445 131/80 -- -- 109 -- -- -- --   05/30/21 1238 -- -- --  "-- -- 96 % -- --   05/30/21 1221 (!) 152/83 99.3  F (37.4  C) Oral 115 26 96 % 1.803 m (5' 11\") 140.6 kg (310 lb)       Physical Exam  Constitutional:       Appearance: He is obese.   HENT:      Head: Normocephalic.      Mouth/Throat:      Mouth: Mucous membranes are moist.   Eyes:      Pupils: Pupils are equal, round, and reactive to light.   Neck:      Musculoskeletal: Normal range of motion.   Cardiovascular:      Rate and Rhythm: Normal rate and regular rhythm.   Pulmonary:      Effort: Pulmonary effort is normal.      Breath sounds: Wheezing present.   Musculoskeletal: Normal range of motion.   Skin:     General: Skin is warm.      Capillary Refill: Capillary refill takes less than 2 seconds.   Neurological:      General: No focal deficit present.      Mental Status: He is alert.   Psychiatric:         Mood and Affect: Mood normal.           Emergency Department Course   Imaging:  XR Chest, portable, 1 view:  Portable chest. Lungs are clear. Heart is normal in size.   No pneumothorax. No definite pleural effusions.     Imaging independently reviewed and agree with radiologist interpretation.      Laboratory:  CBC: WBC 12.5 (H), HGB 13.6,      CMP:  (H), Ca 8.3 (L), bilirubin 1.4 (H), albumin 3.0 (L),  (H),  (H), o/w WNL (Creatinine 1.01)     Procalcitonin: 0.07     UA with microscopic: protein albumin 20 (A), mucous present (A), o/w WNL     Symptomatic Influenza A/B & SARS-CoV2 (COVID19) Virus PCR Multiplex: negative     Emergency Department Course:    Reviewed:  1221 I reviewed nursing notes, vitals and past medical history.    Assessments:  1226 I obtained history and examined the patient as noted above.   1357 I rechecked the patient and discussed all results.     Interventions:  1256: Duoneb 3 mL nebulization   1428: Prednisone 40 mg PO    Disposition:  The patient was discharged to home.     Impression & Plan   Medical Decision Making:  Patient presents with shortness of breath " cough and his self-described fever at home with a normal temperature here.  Patient's lab work are unremarkable x-ray shows no signs of pneumonia patient is had urinary frequency and urgency without concern for dysuria UA is negative for infection.  As of symptoms are unclear suspect viral illness.  Calcitonin is low and doubt need for antibiotics.  Patient is at upper respiratory congestion will recommend steroid as anti-inflammatory and albuterol for wheezing as he is symptomatically improved with this in the emergency room and follow-up with primary care for reassessment.    Critical Care Time: none    Covid-19  Som Mathews was evaluated during a global COVID-19 pandemic, which necessitated consideration that the patient might be at risk for infection with the SARS-CoV-2 virus that causes COVID-19.   Applicable protocols for evaluation were followed during the patient's care.   COVID-19 was considered as part of the patient's evaluation. The plan for testing is:  a test was obtained during this visit.    Diagnosis:    ICD-10-CM    1. Viral upper respiratory infection  J06.9    2. Wheezing  R06.2        Discharge Medications:  Discharge Medication List as of 5/30/2021  2:42 PM      START taking these medications    Details   albuterol (PROAIR HFA/PROVENTIL HFA/VENTOLIN HFA) 108 (90 Base) MCG/ACT inhaler Inhale 2 puffs into the lungs every 6 hours as needed for shortness of breath / dyspnea or wheezing (WITH SPACER), Disp-8.5 g, R-0, [facility] may dispense brand covered by insurance (Proair, or proventil or ventolin or generic albuterol inhale r)      predniSONE (DELTASONE) 20 MG tablet Take two tablets (= 40mg) each day for 5 (five) days, Disp-10 tablet, R-0, [facility]             Scribe Disclosure:  I, Blanca Cline, am serving as a scribe at 12:24 PM on 5/30/2021 to document services personally performed by Kashif Palma MD based on my observations and the provider's statements to me.               Kashif Palma MD  05/31/21 150

## 2021-05-31 NOTE — PROGRESS NOTES
MALE PREVENTATIVE EXAM    Assessment and Plan:       1. Encounter for general adult medical examination with abnormal findings  Discussed one-time routine screening of HIV and hepatitis C, labs ordered today, will inform patient of result.  - Hepatitis C Antibody (Anti-HCV)  - HIV Antigen/Antibody Screening Cascade    2. Achilles tendinitis of left lower extremity  Discussed findings on exam, at this time suspect more likely musculoskeletal, likely Achilles tendinitis.  Obesity may also be playing a role in his symptoms.  Would recommend referral to physical therapy, can take NSAIDs if needed.  - Ambulatory referral to PT/OT    3. Hearing loss of left ear, unspecified hearing loss type  Referral to audiology for further evaluation.  - Ambulatory referral to Audiology    4. Umbilical hernia without obstruction and without gangrene  Easily reducible today.  Discussed weight loss, consider referral to general surgery in the future if needed.    5. Intermittent tingling sensation of left hand and foot  Patient had normal neuro exam today.  The symptoms are intermittent.  May be related to weight.  Continue to monitor for now, if he notes any persisting or worsening symptoms return to clinic for reevaluation.    6. Obesity, Class III, BMI 40-49.9 (morbid obesity) (H)  Discussed with patient lifestyle changes, including decreasing carbohydrate intake and fast food intake, which patient is Heath done.  We discussed weight loss options briefly, can consider medical weight management in the future.    7. Screen for colon cancer  8. Family history of colon cancer  Given family history of colon cancer, and that he does not recall when he may have last had colonoscopy and he knows it has been at least 3 years, and recommend screening colonoscopy.  - Ambulatory referral for Colonoscopy    9. Encounter for screening for diabetes mellitus  10. Encounter for screening for lipoid disorders  Fasting today, will check labs, inform  "patient of results, discuss additional treatment options if needed.  - Glucose  - Lipid Cascade- FASTING     Next follow up:  Return in about 2 months (around 11/3/2019), or if symptoms worsen or fail to improve.    Immunization Review  Adult Imm Review: Due today, orders placed  BMI: Discussed, see above    I discussed the following with the patient:   Adult Healthy Living: Importance of regular exercise  Healthy nutrition  Weight loss referral options    I have had an Advance Directives discussion with the patient.    Subjective:   Chief Complaint: Som Mathews is an 58 y.o. male, new to [facility], here for a preventative health visit.  He needed to leave early from a prior scheduled appointment a few weeks ago because his daughter had newly diagnosed type 1 diabetes and he needed to bring her to the ED. Has additional concerns today, patient was made aware of possible split billing.       HPI:  Left ankle/posterior heel pain: Has noted intermittent ankle/achilles pain only in posterior area.  Has been going off and on about 1 year, though a few months ago, noted more severe pain.  Doesn't recall any injury.  Pain not severe enough to take any medicine.    He says he has an umbilical hernia, \"but will dealt with when I lose weight.\" Not painful.  Has normal BMs.    Was diagnosed \"a long time ago\" with hearing loss in left ear, though never wore hearing aid.  No ear pain.      He is worried about onset of diabetes for himself.  Brother 2 years older than him recently diagnosed with Type 2 diabetes, and 12 year old daughter recently diagnosed with Type 1 diabetes.  For the last 15 years, hasn't been as active.  Had previously played football.      Family history of colon cancer, with brother who  at age 49 and an uncle who also passed of colon cancer.  Thinks it may has been at least 3 years since he last had colonoscopy.      Has noted intermittent tingling of left arm and leg, more noticeable when playing guitar.  " "Not present currently.  No weakness or other neuro deficits noted.    Healthy Habits  Are you taking a daily aspirin? No  Do you typically exercising at least 40 min, 3-4 times per week?  NO  Do you usually eat at least 4 servings of fruit and vegetables a day, include whole grains and fiber and avoid regularly eating high fat foods? Yes  Have you had an eye exam in the past two years? Yes  Do you see a dentist twice per year? NO  Do you have any concerns regarding sleep? YES Has CPAP    Safety Screen  If you own firearms, are they secured in a locked gun cabinet or with trigger locks? The patient does not own any firearms  Do you feel you are safe where you are living?: Yes (9/3/2019  1:35 PM)  Do you feel you are safe in your relationship(s)?: Yes (9/3/2019  1:35 PM)      Review of Systems:  Please see above.  The rest of the review of systems are negative for all systems.     Cancer Screening       Status Date      COLONOSCOPY Overdue 11/3/2010           Patient Care Team:  Rhoda Flanagan MD as PCP - General (Family Medicine)        History     Reviewed By Date/Time Sections Reviewed    Rhoda Flanagan MD 9/3/2019  1:56 PM Social Documentation    Rhoda Flanagan MD 9/3/2019  1:55 PM Surgical, Family    Rhoda Flanagan MD 9/3/2019  1:54 PM Medical, Surgical    [facility] Center[facility]andra tate CMA 9/3/2019  1:36 PM Tobacco    Sandra Rivera CMA 9/3/2019  1:35 PM Tobacco            Objective:   Vital Signs:   Visit Vitals  /80 (Patient Site: Left Arm, Patient Position: Sitting, Cuff Size: Adult Large)   Pulse 72   Temp 98.6  F (37  C) (Oral)   Resp 16   Ht 5' 10\" (1.778 m)   Wt (!) 312 lb 9 oz (141.8 kg)   BMI 44.85 kg/m           PHYSICAL EXAM  General Appearance: Alert, cooperative, no distress, appears stated age  Head: Normocephalic, without obvious abnormality, atraumatic  Eyes: PERRL, conjunctiva/corneas clear, EOM's intact  Ears: Normal TM's and external ear canals, both ears  Nose: Nares normal, septum " midline,mucosa normal, no drainage  Throat: Lips, mucosa, and tongue normal; teeth and gums normal  Neck: Supple, symmetrical, trachea midline, no adenopathy;  thyroid: not enlarged, symmetric, no tenderness/mass/nodules; no carotid bruit or JVD  Lungs: Clear to auscultation bilaterally, respirations unlabored  Heart: Regular rate and rhythm, S1 and S2 normal, no murmur.  Abdomen: Soft, non-tender, bowel sounds active all four quadrants,  no masses, no organomegaly.  Umbilical hernia easily reducible, no tenderness.  Genitourinary: No hernias palpated  Musculoskeletal: Normal range of motion. No joint swelling or deformity. Normal squeeze test bilaterally.  Mild tenderness palpation at Achilles insertion at the heel on the left.  Extremities: Extremities normal, atraumatic, no cyanosis or edema.  2+ pulses grossly intact.  Skin: Skin color, texture, turgor normal, no rashes or lesions  Lymph nodes: Cervical, supraclavicular, and axillary nodes normal  Neurologic: Alert.  Normal speech.  No focal deficits.  Normal deep tendon reflexes. Strength 5 out of 5 of all major muscle groups of upper and lower extremities.   Psychiatric: Normal mood and affect.  Does not appear anxious or depressed.        The ASCVD Risk score (Nai BHARATH Jr., et al., 2013) failed to calculate for the following reasons:    Cannot find a previous HDL lab    Cannot find a previous total cholesterol lab         Medication List      as of 9/3/2019  2:34 PM     You have not been prescribed any medications.         Additional Screenings Completed Today:

## 2021-06-03 VITALS
SYSTOLIC BLOOD PRESSURE: 122 MMHG | DIASTOLIC BLOOD PRESSURE: 80 MMHG | BODY MASS INDEX: 44.74 KG/M2 | RESPIRATION RATE: 16 BRPM | HEART RATE: 72 BPM | WEIGHT: 312.56 LBS | TEMPERATURE: 98.6 F | HEIGHT: 70 IN

## 2021-06-05 VITALS
BODY MASS INDEX: 45.1 KG/M2 | WEIGHT: 315 LBS | DIASTOLIC BLOOD PRESSURE: 76 MMHG | OXYGEN SATURATION: 98 % | RESPIRATION RATE: 24 BRPM | HEART RATE: 96 BPM | SYSTOLIC BLOOD PRESSURE: 128 MMHG | HEIGHT: 70 IN | TEMPERATURE: 97.7 F

## 2021-06-12 NOTE — PROGRESS NOTES
"MALE PREVENTATIVE EXAM    Assessment and Plan:     Patient has been advised of split billing requirements and indicates understanding: Yes    1. Encounter for general adult medical examination with abnormal findings      2. Dyspnea on exertion  Given patient's symptoms, I did recommend chest x-ray and EKG, however patient declined, as he stated he wanted to review this with his wife first.  Informed him that he may need another office visit if he would like to proceed with testing, I did also inform him of the potential risks, including possibly death, without proceeding with testing.  Patient understands.    3. Paresthesias  Discussed with patient that we could order lab testing such as CBC, thyroid studies, electrolytes and kidney function, consider EMG.  However again, patient declined testing wanted to review with his wife first.    4. Obesity, Class III, BMI 40-49.9 (morbid obesity) (H)  Counseled patient regarding lifestyle changes in order to lose weight, as he is currently at an unhealthy weight that can put him at increased risk of complications with Covid, for example and also increased risk for other diseases such as coronary artery disease, heart disease, cancer, etc.    5. Need for vaccination  - Influenza, Recombinant, Inj, Quadrivalent, PF, 18+YRS     Next follow up:  Return in about 1 month (around 11/20/2020) for Follow Up.    Immunization Review  Adult Imm Review: Due today, orders placed    I discussed the following with the patient:   Adult Healthy Living: Importance of regular exercise  Healthy nutrition  Weight loss referral options      Subjective:   Chief Complaint: Som Mathews is an 59 y.o. male here for a preventative health visit.    Patient has been advised of split billing requirements and indicates understanding: Yes     HPI:  Says he doesn't feel \"sick\" currently, but shortness of breath can occur with physical activity.  Was doing some strenuous physical labor the last few weeks, " "but stairs can make him feel winded. Has been ongoing for years.      In the last few years, have also noted tingling sensation periodically in left upper and lower extremity.  No symptoms during the day, but more when lying on back.  No weakness of arms and legs.  No bowel or bladder incontinence.  Playing guitar can worsen symptoms.        Healthy Habits  Are you taking a daily aspirin? No  Do you typically exercising at least 40 min, 3-4 times per week?  NO  Do you usually eat at least 4 servings of fruit and vegetables a day, include whole grains and fiber and avoid regularly eating high fat foods? Yes  Have you had an eye exam in the past two years? Yes  Do you see a dentist twice per year? Yes  Do you have any concerns regarding sleep? No    Safety Screen  If you own firearms, are they secured in a locked gun cabinet or with trigger locks? The patient does not own any firearms  Do you feel you are safe where you are living?: Yes (10/20/2020  3:06 PM)  Do you feel you are safe in your relationship(s)?: Yes (10/20/2020  3:06 PM)      Review of Systems:  Please see above.  The rest of the review of systems are negative for all systems.     Cancer Screening     Patient has no health maintenance due at this time          Patient Care Team:  Rhoda Flanagan MD as PCP - General (Family Medicine)  Rhoda Flanagan MD as Assigned PCP        History     Reviewed By Date/Time Sections Reviewed    Rhoda Flanagan MD 10/20/2020  3:32 PM Medical, Surgical, Tobacco, Alcohol, Drug Use, Family    Shital Xie CMA 10/20/2020  3:07 PM Tobacco            Objective:   Vital Signs:   Visit Vitals  /76 (Patient Site: Right Arm, Patient Position: Sitting, Cuff Size: Adult Large)   Pulse 96   Temp 97.7  F (36.5  C) (Tympanic)   Resp 24   Ht 5' 10\" (1.778 m)   Wt (!) 324 lb 9.6 oz (147.2 kg)   SpO2 98%   BMI 46.58 kg/m           PHYSICAL EXAM  General Appearance: Alert, cooperative, no distress, appears stated age  Head: " Normocephalic, without obvious abnormality, atraumatic  Eyes: PERRL, conjunctiva/corneas clear, EOM's intact  Ears: Normal TM's and external ear canals, both ears  Throat: Lips, mucosa, and tongue normal; teeth and gums normal  Neck: Supple, symmetrical, trachea midline, no adenopathy;  thyroid: not enlarged, symmetric, no tenderness/mass/nodules; no carotid bruit or JVD  Lungs: Clear to auscultation bilaterally, respirations unlabored  Heart: Regular rate and rhythm, S1 and S2 normal, no murmur.  Abdomen: Soft, non-tender, bowel sounds active all four quadrants,  no masses, no organomegaly.  Easily reducible umbilical hernia.  Genitourinary: Penis normal. No hernias palpated  Musculoskeletal: Normal range of motion. No joint swelling or deformity.   Extremities: Extremities normal, atraumatic, no cyanosis or edema  Skin: Skin color, texture, turgor normal, no rashes or lesions  Lymph nodes: Cervical, supraclavicular, and axillary nodes normal  Neurologic: Alert.  Normal speech.  No focal deficits.  Normal deep tendon reflexes.   Psychiatric: Normal mood and affect.  Does not appear anxious or depressed.        The 10-year ASCVD risk score (Nai BHARATH Jr., et al., 2013) is: 7.3%    Values used to calculate the score:      Age: 59 years      Sex: Male      Is Non- : No      Diabetic: No      Tobacco smoker: No      Systolic Blood Pressure: 128 mmHg      Is BP treated: No      HDL Cholesterol: 48 mg/dL      Total Cholesterol: 178 mg/dL         Medication List      as of October 20, 2020  3:36 PM     You have not been prescribed any medications.         Additional Screenings Completed Today:

## 2021-06-12 NOTE — PATIENT INSTRUCTIONS - [facility]
-Recommend getting EKG, chest x-ray  -Consider EMG (nerve conduction study) for symptoms of arm and leg  -Labs: thyroid studies, electrolytes, kidney function.     -Check with insurance to determine where you can get Shingles vaccine

## 2021-06-13 NOTE — TELEPHONE ENCOUNTER
Left message to call back for: Erick  Information to relay to patient:  LMTCB    Please assist patient in scheduling a video visit to discuss symptoms and a referral for sleep medicine. Per Dr Panchito adames to book virtual visit today at 5:20 pm

## 2021-06-13 NOTE — TELEPHONE ENCOUNTER
No referral to sleep medicine in chart    Last seen for physical  10/20/2020    1. Encounter for general adult medical examination with abnormal findings        2. Dyspnea on exertion  Given patient's symptoms, I did recommend chest x-ray and EKG, however patient declined, as he stated he wanted to review this with his wife first.  Informed him that he may need another office visit if he would like to proceed with testing, I did also inform him of the potential risks, including possibly death, without proceeding with testing.  Patient understands.     3. Paresthesias  Discussed with patient that we could order lab testing such as CBC, thyroid studies, electrolytes and kidney function, consider EMG.  However again, patient declined testing wanted to review with his wife first.     4. Obesity, Class III, BMI 40-49.9 (morbid obesity) (H)  Counseled patient regarding lifestyle changes in order to lose weight, as he is currently at an unhealthy weight that can put him at increased risk of complications with Covid, for example and also increased risk for other diseases such as coronary artery disease, heart disease, cancer, etc.     5. Need for vaccination  - Influenza, Recombinant, Inj, Quadrivalent, PF, 18+YRS      Next follow up:  Return in about 1 month (around 11/20/2020) for Follow Up.

## 2021-06-13 NOTE — TELEPHONE ENCOUNTER
Patient Returning Call  Reason for call:  Patient   Information relayed to patient:  Message below  Patient has additional questions:  Yes  If YES, what are your questions/concerns:  Patient agrees that 520 today works ,would like a call back to confirm that he is good to go. Unable to log into my chart to confirm appointment time.  CMT- does not schedule appointments  Okay to leave a detailed message?: Yes

## 2021-06-13 NOTE — TELEPHONE ENCOUNTER
This was not discussed with me at last visit.  He should be getting order from sleep medicine physician.  If he doesn't have one here, he'll need appointment (VV ok) with me to discuss.

## 2021-06-13 NOTE — TELEPHONE ENCOUNTER
Orders being requested: CPAP supplies  Reason service is needed/diagnosis: sleep apnea  When are orders needed by: soon   Where to send Orders: Insurance based medical supply store  Okay to leave detailed message?  Yes

## 2021-06-13 NOTE — TELEPHONE ENCOUNTER
Called and spoke with patient, scheduled him today at 5:20 for a video visit. Patient then decided to cancel this appointment and speak with his insurance to see if he needs a referral or not for sleep medicine.    Patient will call back ryder Xie CMA

## 2021-06-15 NOTE — PROGRESS NOTES
Chief Complaint   Patient presents with     Urinary Tract Infection     started friday night, frequent urinating, pressure and pain when urinating, no blood in urine, odor and cloudy urine, urgency to urinate         Clinical Decision Making: UTI. DDx: prostatitis, urinary obstruction, BPH.  Pt reporting Fall health physical but denies knowledge of prostate exam. No bladder distention with palpation. He describes his urination as difficult to stop as opposed to a difficulty in initiating stream. His urgency is reported as extensive to the point that he has began to urinate prior to making it to the bathroom. As he continues to urinate, his stream is not interrupted but he describes it as a continuous feeling that he has to go more. No notation of cramps, blood, or pain indicative of lithiasis. No costovertebral angle tenderness. Pt did report history of UTI and presence of foreskin. Pt unaware of origin of UTI infections and was educated on development of UTI and potential sources of infection. Treatment plan to include oral antibiotics for treatment of infection with follow-up to PCP if infectious symptoms not resolving or getting worse. Pt educated on hydration in efforts to assist the body in clearance of microbes as well as hygiene to prevent further infections.     1. Acute cystitis without hematuria  nitrofurantoin, macrocrystal-monohydrate, (MACROBID) 100 MG capsule   2. Dysuria  Urinalysis-UC if Indicated    Culture, Urine         Patient Instructions   1. Increase fluid intake  2. Complete antibiotic regimen as prescribed. You will be notified if the treatment plan needs to be changed based on the urine culture results.   3. Follow if you have a fever of 100.4 F (38 C) or higher, no improvement after three days of treatment, trouble urinating because of pain, rash or joint pain, Increased back or abdominal pain.         HPI:  Som Mathews is a 60 y.o. male who presents today complaining of urinary  symptoms including urgency, frequency and pain/ pressure with urination. Pt has history of UTI, but denies burning with urination. Denies hematuria. Reports urine to be cloudy and foul smelling. He did follow up with statements of difficulty in stopping flow of urine more so than difficulty starting or maintaining the stream.     History obtained from patient.    Problem List:  2019-09: Family history of colon cancer  2019-09: Obesity, Class III, BMI 40-49.9 (morbid obesity) (H)  2019-09: Umbilical hernia without obstruction and without gangrene      Past Medical History:   Diagnosis Date     Active rheumatic fever     was on PCN for 2 years in [employer]       Social History     Tobacco Use     Smoking status: Never Smoker     Smokeless tobacco: Never Used   Substance Use Topics     Alcohol use: Never     Frequency: Never       Review of Systems   Constitutional: Negative for chills and fever.   Genitourinary: Positive for dysuria, frequency and urgency. Negative for discharge, flank pain, hematuria, scrotal swelling and testicular pain.       Vitals:    03/02/21 0916   BP: 128/85   Pulse: 89   Resp: 16   Temp: 97.9  F (36.6  C)   TempSrc: Oral   SpO2: 97%       Physical Exam  Vitals signs and nursing note reviewed.   Constitutional:       General: He is not in acute distress.     Appearance: He is well-developed. He is not diaphoretic.   HENT:      Head: Normocephalic and atraumatic.      Right Ear: External ear normal.      Left Ear: External ear normal.   Eyes:      General:         Right eye: No discharge.         Left eye: No discharge.      Conjunctiva/sclera: Conjunctivae normal.   Pulmonary:      Effort: Pulmonary effort is normal. No respiratory distress.   Abdominal:      General: Abdomen is flat. There is no distension.      Palpations: Abdomen is soft.      Tenderness: There is no abdominal tenderness. There is no right CVA tenderness, left CVA tenderness, guarding or rebound.   Neurological:      Mental  Status: He is alert.   Psychiatric:         Behavior: Behavior normal.         Thought Content: Thought content normal.         Judgment: Judgment normal.         Labs:  Recent Results (from the past 72 hour(s))   Urinalysis-UC if Indicated   Result Value Ref Range    Color, UA Yellow Colorless, Yellow, Straw, Light Yellow    Clarity, UA Cloudy (!) Clear    Glucose, UA Negative Negative    Bilirubin, UA Negative Negative    Ketones, UA Negative Negative    Specific Gravity, UA 1.020 1.005 - 1.030    Blood, UA Moderate (!) Negative    pH, UA 6.0 5.0 - 8.0    Protein,  mg/dL (!) Negative mg/dL    Urobilinogen, UA 0.2 E.U./dL 0.2 E.U./dL, 1.0 E.U./dL    Nitrite, UA Negative Negative    Leukocytes, UA Large (!) Negative    Bacteria, UA Few (!) None Seen hpf    RBC, UA  (!) None Seen, 0-2 hpf    WBC, UA >100 (!) None Seen, 0-5 hpf    Squam Epithel, UA None Seen None Seen, 0-5 lpf         At the end of the encounter, I discussed results, diagnosis, medications. Discussed red flags for immediate return to clinic/ER, as well as indications for follow up if no improvement. Patient understood and agreed to plan. Patient was stable for discharge.

## 2021-06-15 NOTE — TELEPHONE ENCOUNTER
Left patient a voicemail. I also stated in the voicemail that a message was sent to him via [employer] as well. Message is regarding his urine culture result.       ----- Message from Marilyn Ortiz CNP sent at 3/4/2021  8:25 AM CST -----  CA to do: Please call patient and tell him to stop nitrofurantoin and start Bactrim antibiotic sent to his pharmacy due urine culture showing resistance.  He also got a [employer] message with this info.     Thanks   Marilyn

## 2021-06-15 NOTE — PATIENT INSTRUCTIONS - HE
1. Increase fluid intake  2. Complete antibiotic regimen as prescribed. You will be notified if the treatment plan needs to be changed based on the urine culture results.   3. Follow if you have a fever of 100.4 F (38 C) or higher, no improvement after three days of treatment, trouble urinating because of pain, rash or joint pain, Increased back or abdominal pain.

## 2021-06-16 PROBLEM — K42.9 UMBILICAL HERNIA WITHOUT OBSTRUCTION AND WITHOUT GANGRENE: Status: ACTIVE | Noted: 2019-09-03

## 2021-06-16 PROBLEM — E66.813 OBESITY, CLASS III, BMI 40-49.9 (MORBID OBESITY) (H): Status: ACTIVE | Noted: 2019-09-03

## 2021-06-16 PROBLEM — E66.01 OBESITY, CLASS III, BMI 40-49.9 (MORBID OBESITY) (H): Status: ACTIVE | Noted: 2019-09-03

## 2021-06-16 PROBLEM — Z80.0 FAMILY HISTORY OF COLON CANCER: Status: ACTIVE | Noted: 2019-09-03

## 2021-09-08 ENCOUNTER — OFFICE VISIT (OUTPATIENT)
Dept: FAMILY MEDICINE | Facility: CLINIC | Age: 61
End: 2021-09-08
Payer: COMMERCIAL

## 2021-09-08 VITALS
HEART RATE: 81 BPM | DIASTOLIC BLOOD PRESSURE: 85 MMHG | HEIGHT: 70 IN | WEIGHT: 315 LBS | BODY MASS INDEX: 45.1 KG/M2 | RESPIRATION RATE: 16 BRPM | TEMPERATURE: 97.7 F | SYSTOLIC BLOOD PRESSURE: 137 MMHG

## 2021-09-08 DIAGNOSIS — Z00.00 ROUTINE GENERAL MEDICAL EXAMINATION AT A HEALTH CARE FACILITY: Primary | ICD-10-CM

## 2021-09-08 DIAGNOSIS — R74.8 ELEVATED LIVER ENZYMES: ICD-10-CM

## 2021-09-08 DIAGNOSIS — H93.13 TINNITUS, BILATERAL: ICD-10-CM

## 2021-09-08 DIAGNOSIS — M72.2 PLANTAR FASCIITIS: ICD-10-CM

## 2021-09-08 DIAGNOSIS — E66.01 OBESITY, CLASS III, BMI 40-49.9 (MORBID OBESITY) (H): ICD-10-CM

## 2021-09-08 DIAGNOSIS — H90.3 SENSORY HEARING LOSS, BILATERAL: ICD-10-CM

## 2021-09-08 DIAGNOSIS — G47.33 OBSTRUCTIVE SLEEP APNEA SYNDROME: ICD-10-CM

## 2021-09-08 DIAGNOSIS — K42.9 UMBILICAL HERNIA WITHOUT OBSTRUCTION AND WITHOUT GANGRENE: ICD-10-CM

## 2021-09-08 PROCEDURE — 99396 PREV VISIT EST AGE 40-64: CPT | Performed by: FAMILY MEDICINE

## 2021-09-08 ASSESSMENT — MIFFLIN-ST. JEOR: SCORE: 2350.76

## 2021-09-08 NOTE — LETTER
September 8, 2021      Som Mathews  [address]  [place] [address]        To Whom It May Concern,      Mr. Mathews has been evaluated in the clinic and has obstructive sleep apnea. He uses this on a nightly basis and it has been effective in treating his symptoms.     Please dispense a new face mask and supplies.    DX: G47.33          Sincerely,        Michael Galicia MD

## 2021-09-08 NOTE — PROGRESS NOTES
SUBJECTIVE:   CC: Som Mathews is an 60 year old male who presents for preventative health visit.     This is a 68-year-old male who presents to the clinic for complete physical examination.  His primary physician previously was Dr. Flanagan who has since left the clinic.    He follows up today to review his medical history.  He has known history of umbilical hernia without obstruction as well as obesity.    A primary concern has been a weight gain.  He states that he has been very challenging for him during the pandemic and his weight has increased.  He is wife are committed to working on weight loss he would like to follow-up for a comprehensive program if possible.    He has a history of colon polyps in the past with a tubular adenoma.  His last colonoscopy was in November of 2019.  He will be due in November of 2024.    He did experience some urinary urgency previously this year.  He had a positive urine culture and was treated with antibiotics.    In reviewing the chart he did have an emergency department visit for some type of febrile illness in May 2021.  He did have abnormal liver enzymes at that time but has not had follow-up since then.  He denies jaundice.  He does not drink a significant amount of alcohol per his report.     He has a known history of obstructive sleep apnea does need a new CPAP device with supplies.  Letter of support will be written.      He has been experiencing some hearing loss bilaterally.  He has tinnitus as well.  He would like to follow-up with an audiologist.      Review of systems is notable for some ankle swelling and discomfort.  He does have pain on the plantar aspect of both feet as well.  Pain is worse with movement and standing.    He denies recent chest pain, shortness of breath, or palpitations.  Has bowel changes.  His mood has generally been stable though this has been a challenging time for him.      {  Patient has been advised of split billing requirements and  indicates understanding: Yes  Healthy Habits:     Getting at least 3 servings of Calcium per day:  NO    Bi-annual eye exam:  Yes    Dental care twice a year:  Yes    Sleep apnea or symptoms of sleep apnea:  Sleep apnea    Diet:  Regular (no restrictions)    Frequency of exercise:  2-3 days/week    Duration of exercise:  15-30 minutes    Barriers to taking medications:  None    Medication side effects:  None    PHQ-2 Total Score: 0    Additional concerns today:  Yes          Today's PHQ-2 Score:   PHQ-2 ( 1999 [employer]) 9/8/2021   Q1: Little interest or pleasure in doing things 0   Q2: Feeling down, depressed or hopeless 0   PHQ-2 Score 0       Abuse: Current or Past(Physical, Sexual or Emotional)- No  Do you feel safe in your environment? Yes        Social History     Tobacco Use     Smoking status: Never Smoker     Smokeless tobacco: Never Used   Substance Use Topics     Alcohol use: Never     If you drink alcohol do you typically have >3 drinks per day or >7 drinks per week? No    No flowsheet data found.No flowsheet data found.    Last PSA: No results found for: PSA    Reviewed orders with patient. Reviewed health maintenance and updated orders accordingly - Yes  Labs reviewed in EPIC    Reviewed and updated as needed this visit by clinical staff  Tobacco  Allergies  Meds              Reviewed and updated as needed this visit by Provider                Past Medical History:   Diagnosis Date     Active rheumatic fever     was on PCN for 2 years in [employer]     Sleep apnea       Past Surgical History:   Procedure Laterality Date     MENISCECTOMY Bilateral      ORTHOPEDIC SURGERY      orthoscopic knee       Review of Systems  CONSTITUTIONAL: NEGATIVE for fever, chills, change in weight  INTEGUMENTARY/SKIN: NEGATIVE for worrisome rashes, moles or lesions  EYES: NEGATIVE for vision changes or irritation  ENT: NEGATIVE for ear, mouth and throat problems  RESP: NEGATIVE for significant cough or SOB  CV: NEGATIVE for  "chest pain, palpitations or peripheral edema  GI: NEGATIVE for nausea, abdominal pain, heartburn, or change in bowel habits   male: negative for dysuria, hematuria, decreased urinary stream, erectile dysfunction, urethral discharge  MUSCULOSKELETAL: NEGATIVE for significant arthralgias or myalgia  NEURO: NEGATIVE for weakness, dizziness or paresthesias  PSYCHIATRIC: NEGATIVE for changes in mood or affect    OBJECTIVE:   /85 (BP Location: Left arm, Patient Position: Sitting, Cuff Size: Adult Large)   Pulse 81   Temp 97.7  F (36.5  C) (Oral)   Resp 16   Ht 1.77 m (5' 9.69\")   Wt (!) 154 kg (339 lb 6.4 oz)   BMI 49.14 kg/m      Physical Exam  GENERAL: healthy, alert and no distress  EYES: Eyes grossly normal to inspection, PERRL and conjunctivae and sclerae normal  HENT: ear canals and TM's normal, nose and mouth without ulcers or lesions  NECK: no adenopathy, no asymmetry, masses, or scars and thyroid normal to palpation  RESP: lungs clear to auscultation - no rales, rhonchi or wheezes  CV: regular rate and rhythm, normal S1 S2, no S3 or S4, no murmur, click or rub, no peripheral edema and peripheral pulses strong  ABDOMEN: soft, nontender, no hepatosplenomegaly, no masses and bowel sounds normal  There is an umbilical hernia noted  MS: no gross musculoskeletal defects noted, trace to 1+ lower extremity edema  SKIN: no suspicious lesions or rashes  NEURO: Normal strength and tone, mentation intact and speech normal  PSYCH: mentation appears normal, affect normal/bright    Diagnostic Test Results:  Labs reviewed in Epic    ASSESSMENT/PLAN:   Som was seen today for physical.    Diagnoses and all orders for this visit:    Routine general medical examination at a health care facility    Recommend increasing physical activity as he is able  Recommend consideration of the Shingrix/shingles vaccine  Reviewed prostate cancer screening    Colonoscopy is up-to-date from November 2018.  Due in November " "2024      Sensory hearing loss, bilateral    Refer to audiology/ENT for a formal hearing evaluation  -     Adult Audiology Referral; Future    Tinnitus, bilateral    Obesity, Class III, BMI 40-49.9 (morbid obesity) (H)    Recommend follow-up for laboratory testing in the future  Refer to Dr. Mandujano for comprehensive weight loss program    Plantar fasciitis    Recommend orthotics  We will refer to podiatry if having ongoing symptoms of concern    Obstructive sleep apnea syndrome    Continue CPAP  A letter will be written for new supplies    Umbilical hernia without obstruction and without gangrene    Refer to surgery as needed  Recommend immediate follow-up if developing severe pain    History of elevated liver enzymes    He did have an emergency room evaluation in May of 2021 and had abnormal liver enzymes at that time  Recommend follow-up to recheck his liver enzymes and consider further evaluation as warranted  He does not drink a significant amount of alcohol  Consider an ultrasound and further laboratory testing if his liver tests remain elevated  Recommend monitoring for jaundice    Patient has been advised of split billing requirements and indicates understanding: Yes  COUNSELING:   Reviewed preventive health counseling, as reflected in patient instructions       Regular exercise       Healthy diet/nutrition       Hearing screening       Colon cancer screening       Prostate cancer screening    Estimated body mass index is 49.14 kg/m  as calculated from the following:    Height as of this encounter: 1.77 m (5' 9.69\").    Weight as of this encounter: 154 kg (339 lb 6.4 oz).     Weight management plan: Discussed healthy diet and exercise guidelines    He reports that he has never smoked. He has never used smokeless tobacco.      Counseling Resources:  ATP IV Guidelines  Pooled Cohorts Equation Calculator  FRAX Risk Assessment  ICSI Preventive Guidelines  Dietary Guidelines for Americans, 2010  USDA's " MyPlate  ASA Prophylaxis  Lung CA Screening    Michael Galicia MD  [facility]

## 2021-09-08 NOTE — PATIENT INSTRUCTIONS
Stepan,    It was good to meet you!    We discussed many topics today.  I wrote a letter for you to get new CPAP supplies.  You can consider follow-up with Dr. Mandujano regarding weight loss  We discussed rechecking laboratory testing in the future including a cholesterol panel, liver panel, and blood sugar testing as well as a PSA which is a prostate cancer screening test  Your colonoscopy is up-to-date from November of 2019.  You will be due again in 2024  I recommend the influenza vaccine when it is available  I recommend the COVID booster when you are due  Follow-up with audiology for a hearing evaluation [phone]  I recommend that you consider orthotics for your plantar fasciitis  You can consider follow-up with a foot specialist/podiatrist  You can consider compression stockings for your leg swelling  You can consider elevating your legs  We can refer you to a vascular clinic if having ongoing issues      Preventive Health Recommendations  Male Ages 50 - 64    Yearly exam:             See your health care provider every year in order to  o   Review health changes.   o   Discuss preventive care.    o   Review your medicines if your doctor has prescribed any.     Have a cholesterol test every 5 years, or more frequently if you are at risk for high cholesterol/heart disease.     Have a diabetes test (fasting glucose) every three years. If you are at risk for diabetes, you should have this test more often.     Have a colonoscopy at age 50, or have a yearly FIT test (stool test). These exams will check for colon cancer.      Talk with your health care provider about whether or not a prostate cancer screening test (PSA) is right for you.    You should be tested each year for STDs (sexually transmitted diseases), if you re at risk.     Shots: Get a flu shot each year. Get a tetanus shot every 10 years.     Nutrition:    Eat at least 5 servings of fruits and vegetables daily.     Eat whole-grain bread, whole-wheat  pasta and brown rice instead of white grains and rice.     Get adequate Calcium and Vitamin D.     Lifestyle    Exercise for at least 150 minutes a week (30 minutes a day, 5 days a week). This will help you control your weight and prevent disease.     Limit alcohol to one drink per day.     No smoking.     Wear sunscreen to prevent skin cancer.     See your dentist every six months for an exam and cleaning.     See your eye doctor every 1 to 2 years.    Preventive Health Recommendations  Male Ages 50 - 64    Yearly exam:             See your health care provider every year in order to  o   Review health changes.   o   Discuss preventive care.    o   Review your medicines if your doctor has prescribed any.     Have a cholesterol test every 5 years, or more frequently if you are at risk for high cholesterol/heart disease.     Have a diabetes test (fasting glucose) every three years. If you are at risk for diabetes, you should have this test more often.     Have a colonoscopy at age 50, or have a yearly FIT test (stool test). These exams will check for colon cancer.      Talk with your health care provider about whether or not a prostate cancer screening test (PSA) is right for you.    You should be tested each year for STDs (sexually transmitted diseases), if you re at risk.     Shots: Get a flu shot each year. Get a tetanus shot every 10 years.     Nutrition:    Eat at least 5 servings of fruits and vegetables daily.     Eat whole-grain bread, whole-wheat pasta and brown rice instead of white grains and rice.     Get adequate Calcium and Vitamin D.     Lifestyle    Exercise for at least 150 minutes a week (30 minutes a day, 5 days a week). This will help you control your weight and prevent disease.     Limit alcohol to one drink per day.     No smoking.     Wear sunscreen to prevent skin cancer.     See your dentist every six months for an exam and cleaning.     See your eye doctor every 1 to 2 years.

## 2021-09-08 NOTE — PROGRESS NOTES
See note   Cephalexin Counseling: I counseled the patient regarding use of cephalexin as an antibiotic for prophylactic and/or therapeutic purposes. Cephalexin (commonly prescribed under brand name Keflex) is a cephalosporin antibiotic which is active against numerous classes of bacteria, including most skin bacteria. Side effects may include nausea, diarrhea, gastrointestinal upset, rash, hives, yeast infections, and in rare cases, hepatitis, kidney disease, seizures, fever, confusion, neurologic symptoms, and others. Patients with severe allergies to penicillin medications are cautioned that there is about a 10% incidence of cross-reactivity with cephalosporins. When possible, patients with penicillin allergies should use alternatives to cephalosporins for antibiotic therapy.

## 2021-10-10 ENCOUNTER — HEALTH MAINTENANCE LETTER (OUTPATIENT)
Age: 61
End: 2021-10-10

## 2022-01-04 ENCOUNTER — IMMUNIZATION (OUTPATIENT)
Dept: NURSING | Facility: CLINIC | Age: 62
End: 2022-01-04
Payer: COMMERCIAL

## 2022-01-04 PROCEDURE — 0004A PR COVID VAC PFIZER DIL RECON 30 MCG/0.3 ML IM: CPT

## 2022-01-04 PROCEDURE — 91300 PR COVID VAC PFIZER DIL RECON 30 MCG/0.3 ML IM: CPT

## 2022-01-06 ENCOUNTER — E-VISIT (OUTPATIENT)
Dept: FAMILY MEDICINE | Facility: CLINIC | Age: 62
End: 2022-01-06
Payer: COMMERCIAL

## 2022-01-06 DIAGNOSIS — R50.9 FEVER, UNSPECIFIED FEVER CAUSE: ICD-10-CM

## 2022-01-06 DIAGNOSIS — Z20.822 SUSPECTED COVID-19 VIRUS INFECTION: ICD-10-CM

## 2022-01-06 DIAGNOSIS — Z20.822 EXPOSURE TO 2019 NOVEL CORONAVIRUS: ICD-10-CM

## 2022-01-06 PROCEDURE — 99421 OL DIG E/M SVC 5-10 MIN: CPT | Performed by: FAMILY MEDICINE

## 2022-01-06 NOTE — PATIENT INSTRUCTIONS
Som,      Based on your responses, you may have coronavirus (COVID-19). This illness can cause fever, cough and trouble breathing. Many people get a mild case and get better on their own. Some people can get very sick.    Will I be tested for COVID-19?  We would like to test you for COVID-19 virus. I have placed orders for this test.     To schedule: go to your [employer] home page and scroll down to the section that says  You have an appointment that needs to be scheduled  and click the large green button that says  Schedule Now  and follow the steps to find the next available openings.    If you are unable to complete these [employer] scheduling steps, please call [phone] to schedule your testing.     Return to work/school/ guidance:  Please let your workplace manager and staffing office know when your isolation ends.       If you receive a positive COVID-19 test result, follow the guidance of the those who are giving you the results. Usually the return to work is 10 days from symptom onset or positive test date, (or in some cases 20 days if you are immunocompromised). If your symptoms started after your positive test, the 10 days should start when your symptoms started.   o If you work at [email or web address], you must also be cleared by Employee Occupational Health and Safety to return to work.      If you receive a negative COVID-19 test result and did not have a high risk exposure to someone with a known positive COVID-19 test, you can return to work once you're free of fever for 24 hours without fever-reducing medication and your symptoms are improving or resolved.    If you receive a negative COVID-19 test and had a high-risk exposure to someone who has tested positive for COVID-19 then you can return to work 14 days after your last contact with the positive individual. Follow quarantine guidance given by your doctor or public health officials.     Sign up for GetWell Loop:  We know it's scary to  hear that you might have COVID-19. We want to track your symptoms to make sure you're okay over the next 2 weeks. Please look for an email from [employer]--this is a free, online program that we'll use to keep in touch. To sign up, follow the link in the email you will receive. Learn more at [email or web address]    How can I take care of myself?  Over the counter medications may help with your symptoms like congestion, cough, chills, or fever.    There are not many effective prescription treatments for early COVID-19. Hydroxychloroquine, ivermectin, and azithromycin are not effective or recommended for COVID-19.    If your symptoms started in the last 10 days, you may be able to receive a treatment with monoclonal antibodies. This treatment can lower your risk of severe illness and going to the hospital. It is given through an IV or under your skin (subcutaneous) and must be given at an infusion center. You must be 12 or older, weight at least 88 pounds, and have a positive COVID-19 test.     If you would like to sign up to be considered to receive the monoclonal antibody medicine, please complete a participation form through the [facility] here: MNRAP ([email or web address]). You may also call the [facility] COVID-19 Public Hotline at 1-965.122.9011 (open Mon-Fri: 9am-7pm and Sat: 10am-6pm).     Not all people who are eligible will receive the medicine, since supply is limited. You will be contacted in the next 1 to 2 business days only if you are selected. If you do not receive a call, you have not been selected to receive the medicine. If you have any questions about this medication, please contact your primary care provider. For more information, see [email or web address]      Get lots of rest. Drink extra fluids (unless a doctor has told you not to)    Take Tylenol (acetaminophen) or ibuprofen for fever  or pain. If you have liver or kidney problems, ask your family doctor if it's okay to take Tylenol o ibuprofen    Take over the counter medications for your symptoms, as directed by your doctor. You may also talk to your pharmacist.      If you have other health problems (like cancer, heart failure, an organ transplant or severe kidney disease): Call your specialty clinic if you don't feel better in the next 2 days.    Know when to call 911. Emergency warning signs include:  o Trouble breathing or shortness of breath  o Pain or pressure in the chest that doesn't go away  o Feeling confused like you haven't felt before, or not being able to wake up  o Bluish-colored lips or face    Where can I get more information?    [facility] - About COVID-19: [email or web address]     CDC - What to Do If You're Sick:     www.cdc.gov/coronavirus/2019-ncov/about/steps-when-sick.html    CDC - Ending Home Isolation:  https://www.cdc.gov/coronavirus/2019-ncov/your-health/quarantine-isolation.html    CDC - Caring for Someone:  www.cdc.gov/coronavirus/2019-ncov/if-you-are-sick/care-for-someone.html    [facility] clinical trials (COVID-19 research studies): [email or web address].[facility]/n-clinical-trials    Below are the COVID-19 hotlines at the [facility] of Health ([facility]). Interpreters are available.  o For health questions: Call [phone] or 1-345.514.2432 (7 a.m. to 7 p.m.)  o For questions about schools and childcare: Call [phone] or [phone] (7 a.m. to 7 p.m.)

## 2022-01-08 ENCOUNTER — LAB (OUTPATIENT)
Dept: FAMILY MEDICINE | Facility: CLINIC | Age: 62
End: 2022-01-08
Attending: FAMILY MEDICINE
Payer: COMMERCIAL

## 2022-01-08 DIAGNOSIS — Z20.822 SUSPECTED COVID-19 VIRUS INFECTION: ICD-10-CM

## 2022-01-08 PROCEDURE — U0005 INFEC AGEN DETEC AMPLI PROBE: HCPCS

## 2022-01-08 PROCEDURE — U0003 INFECTIOUS AGENT DETECTION BY NUCLEIC ACID (DNA OR RNA); SEVERE ACUTE RESPIRATORY SYNDROME CORONAVIRUS 2 (SARS-COV-2) (CORONAVIRUS DISEASE [COVID-19]), AMPLIFIED PROBE TECHNIQUE, MAKING USE OF HIGH THROUGHPUT TECHNOLOGIES AS DESCRIBED BY CMS-2020-01-R: HCPCS

## 2022-01-10 ENCOUNTER — TRANSFERRED RECORDS (OUTPATIENT)
Dept: HEALTH INFORMATION MANAGEMENT | Facility: CLINIC | Age: 62
End: 2022-01-10
Payer: COMMERCIAL

## 2022-01-10 LAB — SARS-COV-2 RNA RESP QL NAA+PROBE: NEGATIVE

## 2022-01-19 ENCOUNTER — OFFICE VISIT (OUTPATIENT)
Dept: FAMILY MEDICINE | Facility: CLINIC | Age: 62
End: 2022-01-19
Payer: COMMERCIAL

## 2022-01-19 VITALS
RESPIRATION RATE: 24 BRPM | BODY MASS INDEX: 45.1 KG/M2 | TEMPERATURE: 98.4 F | WEIGHT: 315 LBS | DIASTOLIC BLOOD PRESSURE: 81 MMHG | SYSTOLIC BLOOD PRESSURE: 137 MMHG | HEIGHT: 70 IN | HEART RATE: 119 BPM

## 2022-01-19 DIAGNOSIS — H91.93 BILATERAL HEARING LOSS, UNSPECIFIED HEARING LOSS TYPE: ICD-10-CM

## 2022-01-19 DIAGNOSIS — R21 RASH: ICD-10-CM

## 2022-01-19 DIAGNOSIS — R30.0 DYSURIA: Primary | ICD-10-CM

## 2022-01-19 LAB
ALBUMIN UR-MCNC: 100 MG/DL
APPEARANCE UR: CLEAR
BACTERIA #/AREA URNS HPF: ABNORMAL /HPF
BILIRUB UR QL STRIP: NEGATIVE
COLOR UR AUTO: YELLOW
GLUCOSE UR STRIP-MCNC: NEGATIVE MG/DL
HGB UR QL STRIP: ABNORMAL
KETONES UR STRIP-MCNC: NEGATIVE MG/DL
LEUKOCYTE ESTERASE UR QL STRIP: ABNORMAL
NITRATE UR QL: NEGATIVE
PH UR STRIP: 5 [PH] (ref 5–8)
RBC #/AREA URNS AUTO: ABNORMAL /HPF
SP GR UR STRIP: 1.02 (ref 1–1.03)
SQUAMOUS #/AREA URNS AUTO: ABNORMAL /LPF
UROBILINOGEN UR STRIP-ACNC: 0.2 E.U./DL
WBC #/AREA URNS AUTO: ABNORMAL /HPF

## 2022-01-19 PROCEDURE — 87186 SC STD MICRODIL/AGAR DIL: CPT | Performed by: FAMILY MEDICINE

## 2022-01-19 PROCEDURE — 99214 OFFICE O/P EST MOD 30 MIN: CPT | Performed by: FAMILY MEDICINE

## 2022-01-19 PROCEDURE — 87086 URINE CULTURE/COLONY COUNT: CPT | Performed by: FAMILY MEDICINE

## 2022-01-19 PROCEDURE — 87088 URINE BACTERIA CULTURE: CPT | Performed by: FAMILY MEDICINE

## 2022-01-19 PROCEDURE — 81001 URINALYSIS AUTO W/SCOPE: CPT | Performed by: FAMILY MEDICINE

## 2022-01-19 RX ORDER — SULFAMETHOXAZOLE/TRIMETHOPRIM 800-160 MG
1 TABLET ORAL 2 TIMES DAILY
Qty: 20 TABLET | Refills: 0 | Status: SHIPPED | OUTPATIENT
Start: 2022-01-19 | End: 2022-01-29

## 2022-01-19 RX ORDER — KETOCONAZOLE 20 MG/G
CREAM TOPICAL DAILY
Qty: 60 G | Refills: 1 | Status: SHIPPED | OUTPATIENT
Start: 2022-01-19 | End: 2023-10-30

## 2022-01-19 ASSESSMENT — MIFFLIN-ST. JEOR: SCORE: 2353.82

## 2022-01-19 NOTE — PATIENT INSTRUCTIONS
Erick,    I sent a prescription for Bactrim to your pharmacy (antibiotic)  Drink plenty of liquids  Use ketoconazole cream for the groin rash  I do encourage follow-up for blood testing in the future  If you have recurrent symptoms we will need to check your urine test  We may need to consider having you see a urologist    Michael Galicia MD

## 2022-01-20 LAB — BACTERIA UR CULT: ABNORMAL

## 2022-01-20 NOTE — PROGRESS NOTES
Assessment/ Plan     1. Dysuria  Probable urinary tract infection    Urinalysis is abnormal and suggestive of a urinary tract infection  Recommend treatment with Bactrim  Reviewed that he did have a urinary tract infection earlier in the year  We will await the culture results and he will continue Bactrim  Recommend follow-up for a recheck urinalysis following treatment  Have a recurrent infections then recommend follow-up with urology    - UA Macro with Reflex to Micro and Culture - lab collect; Future  - UA Macro with Reflex to Micro and Culture - lab collect  - Urine Microscopic  - Urine Culture  - sulfamethoxazole-trimethoprim (BACTRIM DS) 800-160 MG tablet; Take 1 tablet by mouth 2 times daily for 10 days  Dispense: 20 tablet; Refill: 0    2. Rash  Probable tinea cruris    Recommend treatment with ketoconazole cream  Consider nystatin powder  Recommend keeping the area dry  If having significant itchiness consider hydrocortisone cream as needed  - ketoconazole (NIZORAL) 2 % external cream; Apply topically daily Apply to groin area BID prn  Dispense: 60 g; Refill: 1    3. Bilateral hearing loss, unspecified hearing loss type    Refer to audiology for a hearing evaluation  - Adult Audiology Referral; Future    4. History of elevated liver enzymes     His liver enzymes were significantly elevated in the past  We reviewed his elevated liver tests and future orders were entered in September of 2021 and he has not yet had that testing  Recommend follow-up to check laboratory testing as recommended  If his liver enzymes remain abnormal he will need further evaluation including an ultrasound and possibly other testing          Subjective:      Som Mathews is a 61 year old adult who presents presents to the clinic for 2 primary concerns. He has developed recent urinary symptoms and also has had a rash in the groin area that is itchy. He states that his wife tested positive on January 8 for COVID-19. He was tested and  "has been negative. He has not had any respiratory symptoms of concern. A concern has been that over the past 2-3 days he has developed urinary frequency. He does have a sense of pressure in the suprapubic area and also has had urgency in general. He cannot think of any unusual exposures. However, he mentions he did move into a new home that requires remodeling. In reviewing the chart he did have a positive urinary tract infection this past year that required treatment with antibiotics. He has not had a fever and denies flank pain.     Additionally, he has had a rash in the groin area. This is in the vicinity of his testicles. This can be quite itchy and he wonders about treatment options.    Finally, he does have a history of hearing loss and would like to follow-up with an audiologist for an evaluation. He has a specific provider in mind given insurance requirements.    The following portions of the patient's history were reviewed and updated as appropriate: allergies, current medications, past family history, past medical history, past social history, past surgical history and problem list. Medications have been reconciled    Review of Systems   A 12 point comprehensive review of systems was negative except as noted.      Current Outpatient Medications   Medication Sig Dispense Refill     ketoconazole (NIZORAL) 2 % external cream Apply topically daily Apply to groin area BID prn 60 g 1     sulfamethoxazole-trimethoprim (BACTRIM DS) 800-160 MG tablet Take 1 tablet by mouth 2 times daily for 10 days 20 tablet 0       Objective:     /81 (BP Location: Left arm, Patient Position: Sitting, Cuff Size: Adult Large)   Pulse 119   Temp 98.4  F (36.9  C) (Oral)   Resp 24   Ht 1.77 m (5' 9.7\")   Wt (!) 154.7 kg (341 lb 2 oz)   Breastfeeding No   BMI 49.37 kg/m      General appearance: alert, appears stated age   Head: normocephalic, without obvious abnormality, atraumatic  Eyes: conjunctivae/corneas clear. PERRL, " EOM's intact.   Skin: There is a mildly erythematous rash in the groin area  Lymph nodes: Cervical nodes normal.  Neurologic: Alert and oriented X 3           Recent Results (from the past 168 hour(s))   UA Macro with Reflex to Micro and Culture - lab collect    Specimen: Urine, Midstream   Result Value Ref Range    Color Urine Yellow Colorless, Straw, Light Yellow, Yellow    Appearance Urine Clear Clear    Glucose Urine Negative Negative mg/dL    Bilirubin Urine Negative Negative    Ketones Urine Negative Negative mg/dL    Specific Gravity Urine 1.025 1.005 - 1.030    Blood Urine Trace (A) Negative    pH Urine 5.0 5.0 - 8.0    Protein Albumin Urine 100  (A) Negative mg/dL    Urobilinogen Urine 0.2 0.2, 1.0 E.U./dL    Nitrite Urine Negative Negative    Leukocyte Esterase Urine Trace (A) Negative   Urine Microscopic   Result Value Ref Range    Bacteria Urine Moderate (A) None Seen /HPF    RBC Urine 2-5 (A) 0-2 /HPF /HPF    WBC Urine 10-25 (A) 0-5 /HPF /HPF    Squamous Epithelials Urine Few (A) None Seen /LPF   Urine Culture    Specimen: Urine, Midstream   Result Value Ref Range    Culture >100,000 CFU/mL Klebsiella pneumoniae (A)           This note has been dictated using voice recognition software. Any grammatical or context distortions are unintentional and inherent to the software    Michael Galicia MD

## 2022-01-28 ENCOUNTER — OFFICE VISIT (OUTPATIENT)
Dept: AUDIOLOGY | Facility: CLINIC | Age: 62
End: 2022-01-28
Payer: COMMERCIAL

## 2022-01-28 DIAGNOSIS — H91.93 BILATERAL HEARING LOSS, UNSPECIFIED HEARING LOSS TYPE: ICD-10-CM

## 2022-01-28 DIAGNOSIS — H90.A22 SENSORINEURAL HEARING LOSS (SNHL) OF LEFT EAR WITH RESTRICTED HEARING OF RIGHT EAR: Primary | ICD-10-CM

## 2022-01-28 PROCEDURE — 99207 PR NO CHARGE LOS: CPT | Performed by: AUDIOLOGIST

## 2022-01-28 PROCEDURE — 92591 PR HEARING AID EXAM BINAURAL: CPT | Performed by: AUDIOLOGIST

## 2022-01-28 NOTE — PROGRESS NOTES
"AUDIOLOGY REPORT    SUBJECTIVE: Som Mathews (\"Erick\") is a 61 year old adult was seen in the Audiology Clinic at  [facility] on 1/28/22 to discuss concerns with hearing and functional communication difficulties. Som [facility] 1[facility]10-[facility], and hand-carried an audiogram in today. Results for the right ear revealed a normal, sloping to severe sensorineural hearing loss. Results for the left ear revealed a moderate, sloping to severe sensorineural hearing loss. He reported that he saw an ENT for the asymmetrical sensorineural hearing loss, and that a subsequent MRI was negative. Som notes difficulty with communication in a variety of listening situations. 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 He notes difficulty in all of these situations, as well as in restaurants and hearing clearing during online meetings.    OBJECTIVE:  Abuse Screening:  Do you feel unsafe at home or work/school? No  Do you feel threatened by someone? No  Does anyone try to keep you from having contact with others, or doing things outside of your home? No  Physical signs of abuse present? No    Patient is a hearing aid candidate. Patient would like to move forward with a hearing aid evaluation today. Therefore, the patient was presented with different options for amplification to help aid in communication. Discussed styles, levels of technology and monaural vs. binaural fitting.     The hearing aid(s) mutually chosen were:  Binaural: Phonak Carolyn P50-[place] BTE   COLOR: P8  BATTERY SIZE: rechargeable  EARMOLD/TIPS: custom acrylic skeleton  CANAL/ LENGTH: n/a    Otoscopy revealed ears are clear of cerumen bilaterally. Bilateral ear impressions were taken without incident; both pre- and post-impression otoscopy was unremarkable in each ear.    ASSESSMENT:     ICD-10-CM    1. Sensorineural " hearing loss (SNHL) of left ear with restricted hearing of right ear  H90.A22    2. Bilateral hearing loss, unspecified hearing loss type  H91.93 Adult Audiology Referral       Reviewed purchase information and warranty information with patient. The 45 day trial period was explained to patient. The patient was given a copy of the [place] Department of [employer] consumer brochure on purchasing hearing instruments. Patient risk factors have been provided to the patient in writing prior to the sale of the hearing aid per FDA regulation. The risk factors are also available in the User Instructional Booklet to be presented on the day of the hearing aid fitting. Hearing aid(s) ordered. Hearing aid evaluation completed.    PLAN: Som is scheduled to return 3-22-22 for a hearing aid fitting and programming. Purchase agreement will be completed on that date. Please contact this clinic with any questions or concerns.      John Deutsch, [facility]-A  [place] Licensed Audiologist [id number]

## 2022-03-22 ENCOUNTER — OFFICE VISIT (OUTPATIENT)
Dept: AUDIOLOGY | Facility: CLINIC | Age: 62
End: 2022-03-22
Payer: COMMERCIAL

## 2022-03-22 DIAGNOSIS — H90.A22 SENSORINEURAL HEARING LOSS (SNHL) OF LEFT EAR WITH RESTRICTED HEARING OF RIGHT EAR: Primary | ICD-10-CM

## 2022-03-22 PROCEDURE — V5011 HEARING AID FITTING/CHECKING: HCPCS | Mod: RT | Performed by: AUDIOLOGIST

## 2022-03-22 PROCEDURE — V5261 HEARING AID, DIGIT, BIN, BTE: HCPCS | Performed by: AUDIOLOGIST

## 2022-03-22 PROCEDURE — V5264 EAR MOLD/INSERT: HCPCS | Mod: RT | Performed by: AUDIOLOGIST

## 2022-03-22 PROCEDURE — V5020 CONFORMITY EVALUATION: HCPCS | Mod: RT | Performed by: AUDIOLOGIST

## 2022-03-22 PROCEDURE — V5160 DISPENSING FEE BINAURAL: HCPCS | Performed by: AUDIOLOGIST

## 2022-03-22 PROCEDURE — 92593 PR HEARING AID CHECK, BINAURAL: CPT | Performed by: AUDIOLOGIST

## 2022-03-22 PROCEDURE — 99207 PR NO CHARGE LOS: CPT | Performed by: AUDIOLOGIST

## 2022-03-22 NOTE — PROGRESS NOTES
AUDIOLOGY REPORT    SUBJECTIVE: Som Mathews, a 61 year old adult, was seen in the Audiology Clinic at [facility] today for a Binaural hearing aid fitting. Previous results (from [facility]) have revealed an asymmetrical sensorineural hearing loss, worse in the left ear than in the right. The patient was given medical clearance to pursue amplification by Dina Zavala MD, of [facility].    OBJECTIVE:  Prior to fitting, a hearing aid check was performed to ensure device functionality. The hearing aid conformity evaluation was completed.The hearing aids were placed and they provided a good fit. Real-ear-probe-microphone measurements were completed on the [employer] system and were a good match to NAL-NL2 target with soft sounds audible, moderate sounds comfortable, and loud sounds below discomfort. UCLs are verified through maximum power output measures and demonstrate appropriate limiting of loud inputs. Mr. Mathews was oriented to proper hearing aid use, care, cleaning (no water, dry brush), use of , aid insertion/removal, user booklet, warranty information, storage cases, and other hearing aid details. The patient confirmed understanding of hearing aid use and care, and showed proper insertion of hearing aid and  use while in the office today. Mr. Mathews reported good volume and sound quality today.    EAR(S) FIT: Binaural  MA HEARING AID MAKE: Right: Phonak; Left: Phonak  MA HEARING AID MODEL #: Right: Carolyn P50-NE; Left: Carolyn P50-NE  HEARING AID STYLE: Right: BTE; Left: BTE  EARMOLDS: Right: Unitron SN [id number]; Intellivent code 360235; Left:  Unitron SN [id number]; Intellivent code 446075  SERIAL NUMBERS: Right: [id number]; Left: [id number]  WARRANTY END DATE: Right: 6/5/2025; Left:: 6/5/2025    CHARGES:   Earmold(s): , Qty 2, $160.00, NU (New), RT (Right) and LT (Left)  Hearing Aid Check: Binaural, 83056, $81.00  Dispensing Fee: Binaural, , $500.00,  LT (Left)  RT (Right)  Fit/Orientation: Binaural, , $416.00  Hearing Aid Conformity Evaluation: 2, , $174.00{RT, LT  Hearing Aid Digital: Binaural, BTE,  ,NU (Binaural) $2429.00  Total: $3760.00       ASSESSMENT: Binaural hearing aid fitting completed today. Verification measures were performed. The 45 day trial period was explained to patient, and they expressed understanding. Mr. Mathews signed the Hearing Aid Purchase Agreement and was given a copy, as well as details on his hearing aids. Patient was counseled that exact out of pocket amounts cannot be determined for hearing aid claims being sent to insurance. Any insurance coverage information presented to the patient is an estimate only, and is not a guarantee of payment. Patient has been advised to check with their own insurance.    PLAN: Mr. Mathews will return for follow-up 4-5-22 for a hearing aid review appointment. Please call this clinic with questions regarding today s appointment.    John Deutsch, [facility]-A  [place] Licensed Audiologist [id number]

## 2022-04-20 ENCOUNTER — OFFICE VISIT (OUTPATIENT)
Dept: AUDIOLOGY | Facility: CLINIC | Age: 62
End: 2022-04-20
Payer: COMMERCIAL

## 2022-04-20 DIAGNOSIS — H90.A22 SENSORINEURAL HEARING LOSS (SNHL) OF LEFT EAR WITH RESTRICTED HEARING OF RIGHT EAR: Primary | ICD-10-CM

## 2022-04-20 PROCEDURE — 99207 PR NO CHARGE LOS: CPT | Performed by: AUDIOLOGIST

## 2022-04-20 PROCEDURE — V5299 HEARING SERVICE: HCPCS | Performed by: AUDIOLOGIST

## 2022-04-20 NOTE — PROGRESS NOTES
AUDIOLOGY REPORT    SUBJECTIVE:Som Mathews is a 61 year old adult who was seen in the Audiology Clinic at the [facility] on 4/20/2022  for a follow-up check regarding the fitting of new hearing aids. Previous results have revealed asymmetrical sensorineural hearing loss, with the left ear worse than the right.  The patient has been seen previously in this clinic and was fit binaurally with Phonak Carolyn P50-[place] BTE devices and custom acrylic skeleton earmolds on 3-22-22.  Som reports much greater ease listening, both at work, at home, and at social events. He feels a greater sense of ease and peace now, with less effort and anxiety surrounding his need to hear.    OBJECTIVE:   Datalogging indicated average daily wear of nearly sixteen hours, over the past 29 days. Targets are at 100% in both devices. Per patient descriptions of a very sibilant /s/ sound and difficulty hearing his passenger while driving, gain was increased for speech when in car noise, and gain was decreased for high frequency speech, respectively.     Reviewed 45 day trial period, care, cleaning (no water, dry brush), earmold cleaning, use of , aid insertion/removal, volume adjustment (if applicable), user booklet, warranty information, storage cases, and other hearing aid details.     No charge visit today (in warranty hearing aid check).     ASSESSMENT: A follow-up appointment for hearing aid fitting was completed today. Changes to hearing aid was completed as outlined above.     PLAN: Som will return for follow-up as needed. He expressed verbal understanding that his trial period will conclude 5-8-22, and that any changes/returns need to occur prior to that date. Please call this clinic with any questions regarding today s appointment.    Fariba Deutsch., [facility]-A  [place] Licensed Audiologist [id number]

## 2022-06-10 ENCOUNTER — HOSPITAL ENCOUNTER (EMERGENCY)
Facility: CLINIC | Age: 62
Discharge: LEFT BEFORE DISCHARGE | End: 2022-06-10
Payer: COMMERCIAL

## 2022-06-10 VITALS
SYSTOLIC BLOOD PRESSURE: 145 MMHG | OXYGEN SATURATION: 98 % | TEMPERATURE: 98 F | BODY MASS INDEX: 44.1 KG/M2 | HEIGHT: 71 IN | RESPIRATION RATE: 18 BRPM | HEART RATE: 86 BPM | WEIGHT: 315 LBS | DIASTOLIC BLOOD PRESSURE: 97 MMHG

## 2022-06-11 NOTE — ED NOTES
Pt wanted to leave, dressing done, no signs of bleeding noted, Advised to come back for any worsening of condition

## 2022-09-18 ENCOUNTER — HEALTH MAINTENANCE LETTER (OUTPATIENT)
Age: 62
End: 2022-09-18

## 2022-11-21 ENCOUNTER — NURSE TRIAGE (OUTPATIENT)
Dept: NURSING | Facility: CLINIC | Age: 62
End: 2022-11-21

## 2022-11-21 ENCOUNTER — APPOINTMENT (OUTPATIENT)
Dept: GENERAL RADIOLOGY | Facility: CLINIC | Age: 62
End: 2022-11-21
Attending: EMERGENCY MEDICINE
Payer: COMMERCIAL

## 2022-11-21 ENCOUNTER — HOSPITAL ENCOUNTER (EMERGENCY)
Facility: CLINIC | Age: 62
Discharge: HOME | End: 2022-11-21
Attending: EMERGENCY MEDICINE | Admitting: EMERGENCY MEDICINE
Payer: COMMERCIAL

## 2022-11-21 VITALS
BODY MASS INDEX: 44.1 KG/M2 | DIASTOLIC BLOOD PRESSURE: 75 MMHG | HEART RATE: 89 BPM | TEMPERATURE: 99.7 F | HEIGHT: 71 IN | SYSTOLIC BLOOD PRESSURE: 112 MMHG | WEIGHT: 315 LBS | OXYGEN SATURATION: 96 % | RESPIRATION RATE: 20 BRPM

## 2022-11-21 DIAGNOSIS — N39.0 FEBRILE URINARY TRACT INFECTION: ICD-10-CM

## 2022-11-21 DIAGNOSIS — J10.1 INFLUENZA A: ICD-10-CM

## 2022-11-21 LAB
ALBUMIN UR-MCNC: 100 MG/DL
ANION GAP SERPL CALCULATED.3IONS-SCNC: 8 MMOL/L (ref 3–14)
APPEARANCE UR: ABNORMAL
ATRIAL RATE - MUSE: 103 BPM
BACTERIA #/AREA URNS HPF: ABNORMAL /HPF
BASOPHILS # BLD AUTO: 0 10E3/UL (ref 0–0.2)
BASOPHILS NFR BLD AUTO: 0 %
BILIRUB UR QL STRIP: NEGATIVE
BUN SERPL-MCNC: 13 MG/DL (ref 7–30)
CALCIUM SERPL-MCNC: 8.7 MG/DL (ref 8.5–10.1)
CHLORIDE BLD-SCNC: 104 MMOL/L (ref 94–109)
CO2 SERPL-SCNC: 24 MMOL/L (ref 20–32)
COLOR UR AUTO: YELLOW
CREAT SERPL-MCNC: 1.12 MG/DL (ref 0.52–1.25)
DIASTOLIC BLOOD PRESSURE - MUSE: NORMAL MMHG
EOSINOPHIL # BLD AUTO: 0 10E3/UL (ref 0–0.7)
EOSINOPHIL NFR BLD AUTO: 0 %
ERYTHROCYTE [DISTWIDTH] IN BLOOD BY AUTOMATED COUNT: 13 % (ref 10–15)
FLUAV RNA SPEC QL NAA+PROBE: POSITIVE
FLUBV RNA RESP QL NAA+PROBE: NEGATIVE
GFR SERPL CREATININE-BSD FRML MDRD: 74 ML/MIN/1.73M2
GLUCOSE BLD-MCNC: 115 MG/DL (ref 70–99)
GLUCOSE UR STRIP-MCNC: NEGATIVE MG/DL
HCT VFR BLD AUTO: 42.4 % (ref 35–53)
HGB BLD-MCNC: 14.4 G/DL (ref 11.7–17.7)
HGB UR QL STRIP: ABNORMAL
HYALINE CASTS: 1 /LPF
IMM GRANULOCYTES # BLD: 0 10E3/UL
IMM GRANULOCYTES NFR BLD: 0 %
INTERPRETATION ECG - MUSE: NORMAL
KETONES UR STRIP-MCNC: NEGATIVE MG/DL
LACTATE SERPL-SCNC: 0.7 MMOL/L (ref 0.7–2)
LEUKOCYTE ESTERASE UR QL STRIP: ABNORMAL
LYMPHOCYTES # BLD AUTO: 2.2 10E3/UL (ref 0.8–5.3)
LYMPHOCYTES NFR BLD AUTO: 19 %
MCH RBC QN AUTO: 30.7 PG (ref 26.5–33)
MCHC RBC AUTO-ENTMCNC: 34 G/DL (ref 31.5–36.5)
MCV RBC AUTO: 90 FL (ref 78–100)
MONOCYTES # BLD AUTO: 1.2 10E3/UL (ref 0–1.3)
MONOCYTES NFR BLD AUTO: 10 %
MUCOUS THREADS #/AREA URNS LPF: PRESENT /LPF
NEUTROPHILS # BLD AUTO: 8.3 10E3/UL (ref 1.6–8.3)
NEUTROPHILS NFR BLD AUTO: 71 %
NITRATE UR QL: POSITIVE
NRBC # BLD AUTO: 0 10E3/UL
NRBC BLD AUTO-RTO: 0 /100
P AXIS - MUSE: 42 DEGREES
PH UR STRIP: 6 [PH] (ref 5–7)
PLATELET # BLD AUTO: 250 10E3/UL (ref 150–450)
POTASSIUM BLD-SCNC: 4 MMOL/L (ref 3.4–5.3)
PR INTERVAL - MUSE: 184 MS
QRS DURATION - MUSE: 104 MS
QT - MUSE: 348 MS
QTC - MUSE: 455 MS
R AXIS - MUSE: -47 DEGREES
RBC # BLD AUTO: 4.69 10E6/UL (ref 3.8–5.9)
RBC URINE: 13 /HPF
RSV RNA SPEC NAA+PROBE: NEGATIVE
SARS-COV-2 RNA RESP QL NAA+PROBE: NEGATIVE
SODIUM SERPL-SCNC: 136 MMOL/L (ref 133–144)
SP GR UR STRIP: 1.02 (ref 1–1.03)
SYSTOLIC BLOOD PRESSURE - MUSE: NORMAL MMHG
T AXIS - MUSE: 15 DEGREES
UROBILINOGEN UR STRIP-MCNC: NORMAL MG/DL
VENTRICULAR RATE- MUSE: 103 BPM
WBC # BLD AUTO: 11.8 10E3/UL (ref 4–11)
WBC URINE: >182 /HPF

## 2022-11-21 PROCEDURE — 87637 SARSCOV2&INF A&B&RSV AMP PRB: CPT | Performed by: EMERGENCY MEDICINE

## 2022-11-21 PROCEDURE — 96365 THER/PROPH/DIAG IV INF INIT: CPT

## 2022-11-21 PROCEDURE — 258N000003 HC RX IP 258 OP 636: Performed by: EMERGENCY MEDICINE

## 2022-11-21 PROCEDURE — 93005 ELECTROCARDIOGRAM TRACING: CPT

## 2022-11-21 PROCEDURE — 81001 URINALYSIS AUTO W/SCOPE: CPT | Performed by: EMERGENCY MEDICINE

## 2022-11-21 PROCEDURE — 250N000011 HC RX IP 250 OP 636: Performed by: EMERGENCY MEDICINE

## 2022-11-21 PROCEDURE — 71046 X-RAY EXAM CHEST 2 VIEWS: CPT

## 2022-11-21 PROCEDURE — 250N000013 HC RX MED GY IP 250 OP 250 PS 637: Performed by: EMERGENCY MEDICINE

## 2022-11-21 PROCEDURE — C9803 HOPD COVID-19 SPEC COLLECT: HCPCS

## 2022-11-21 PROCEDURE — 36415 COLL VENOUS BLD VENIPUNCTURE: CPT | Performed by: EMERGENCY MEDICINE

## 2022-11-21 PROCEDURE — 87086 URINE CULTURE/COLONY COUNT: CPT | Performed by: EMERGENCY MEDICINE

## 2022-11-21 PROCEDURE — 96366 THER/PROPH/DIAG IV INF ADDON: CPT

## 2022-11-21 PROCEDURE — 80048 BASIC METABOLIC PNL TOTAL CA: CPT | Performed by: EMERGENCY MEDICINE

## 2022-11-21 PROCEDURE — 85004 AUTOMATED DIFF WBC COUNT: CPT | Performed by: EMERGENCY MEDICINE

## 2022-11-21 PROCEDURE — 99285 EMERGENCY DEPT VISIT HI MDM: CPT | Mod: 25

## 2022-11-21 PROCEDURE — 83605 ASSAY OF LACTIC ACID: CPT | Performed by: EMERGENCY MEDICINE

## 2022-11-21 RX ORDER — CEPHALEXIN 500 MG/1
500 CAPSULE ORAL 2 TIMES DAILY
Qty: 20 CAPSULE | Refills: 0 | Status: SHIPPED | OUTPATIENT
Start: 2022-11-21 | End: 2022-12-01

## 2022-11-21 RX ORDER — CEFTRIAXONE 2 G/1
2 INJECTION, POWDER, FOR SOLUTION INTRAMUSCULAR; INTRAVENOUS ONCE
Status: COMPLETED | OUTPATIENT
Start: 2022-11-21 | End: 2022-11-21

## 2022-11-21 RX ORDER — LIDOCAINE 40 MG/G
CREAM TOPICAL
Status: DISCONTINUED | OUTPATIENT
Start: 2022-11-21 | End: 2022-11-21 | Stop reason: HOSPADM

## 2022-11-21 RX ORDER — ACETAMINOPHEN 325 MG/1
650 TABLET ORAL ONCE
Status: COMPLETED | OUTPATIENT
Start: 2022-11-21 | End: 2022-11-21

## 2022-11-21 RX ADMIN — ACETAMINOPHEN 650 MG: 325 TABLET, FILM COATED ORAL at 06:53

## 2022-11-21 RX ADMIN — SODIUM CHLORIDE 1000 ML: 9 INJECTION, SOLUTION INTRAVENOUS at 06:53

## 2022-11-21 RX ADMIN — CEFTRIAXONE SODIUM 2 G: 2 INJECTION, POWDER, FOR SOLUTION INTRAMUSCULAR; INTRAVENOUS at 06:53

## 2022-11-21 ASSESSMENT — ENCOUNTER SYMPTOMS
DIFFICULTY URINATING: 1
VOMITING: 0
FEVER: 1
FREQUENCY: 1
DIARRHEA: 1
NAUSEA: 0
COUGH: 1
DYSURIA: 1

## 2022-11-21 ASSESSMENT — ACTIVITIES OF DAILY LIVING (ADL)
ADLS_ACUITY_SCORE: 35
ADLS_ACUITY_SCORE: 35

## 2022-11-21 NOTE — ED PROVIDER NOTES
"  History   Chief Complaint:  Dysuria and Cough     The history is provided by the patient.      Som Mathews is a 62 year old adult who presents with worsening flu-like symptoms for the past week along with new urinary changes over the past 1-2 days. The patient developed cold-like symptoms 1 month ago but had otherwise been well until developing recurrent cough, fever, and considerable discomfort in his upper chest last week. A couple of days ago, he additionally developed new urinary urgency, frequency, retention, and dysuria. With worsening fever this morning and persisting symptoms as noted above, the patient grew concerned and presented to ED for evaluation today. He has had some recent diarrhea but denies nausea or vomiting. He has history of UTI \"years ago\" and denies other past medical problems. He takes no daily medication and denies known drug allergies.    Review of Systems   Constitutional: Positive for fever.   Respiratory: Positive for cough.    Cardiovascular: Positive for chest pain.   Gastrointestinal: Positive for diarrhea. Negative for nausea and vomiting.   Genitourinary: Positive for difficulty urinating, dysuria, frequency and urgency.   All other systems reviewed and are negative.     Allergies:  No known drug allergies    Medications:  The patient takes no daily medication.    Past Medical History:     Obesity  UTI  Umbilical hernia    Past Surgical History:    Meniscectomy, bilateral  Orthoscopic knee    Family History:    Colon cancer  Type 1 diabetes mellitus    Social History:  The patient presents to the ED alone.  The patient arrived in a private vehicle.  PCP: Michael Galicia     Physical Exam     Patient Vitals for the past 24 hrs:   BP Temp Temp src Pulse Resp SpO2 Height Weight   11/21/22 0720 -- -- -- 89 -- 97 % -- --   11/21/22 0708 -- -- -- -- -- 95 % -- --   11/21/22 0705 -- -- -- 85 -- 95 % -- --   11/21/22 0343 124/76 99.7  F (37.6  C) Temporal 111 20 95 % 1.803 m (5' " "11\") (!) 154.2 kg (340 lb)     Physical Exam  General: Well-nourished  Eyes: PERRL, conjunctivae pink no scleral icterus or conjunctival injection  ENT:  Moist mucus membranes, posterior oropharynx clear without erythema or exudates  Respiratory:  +fine crackles base of left lung. No rubs/wheezes.  Good air movement.   CV: Mildly tachycardic rate and rhythm, no murmurs/rubs/gallops  GI:  Abdomen soft and non-distended.  Normoactive BS.  No tenderness, guarding or rebound  Skin: Warm, dry.  No rashes or petechiae  Musculoskeletal: No peripheral edema or calf tenderness  Neuro: Alert and oriented to person/place/time  Psychiatric: Normal affect    Emergency Department Course   ECG  ECG results from 11/21/22   EKG 12-lead, tracing only     Value    Systolic Blood Pressure     Diastolic Blood Pressure     Ventricular Rate 103    Atrial Rate 103    DC Interval 184    QRS Duration 104        QTc 455    P Axis 42    R AXIS -47    T Axis 15    Interpretation ECG      Sinus tachycardia  Left anterior fascicular block  Abnormal ECG  When compared with ECG of 28-DEC-2018 13:57,  No significant change was found  Confirmed by GENERATED REPORT, COMPUTER ([id number]),  Adeola Diamond ([id number]) on 11/21/2022 4:56:25 AM       Imaging:  XR Chest 2 Views   Final Result   IMPRESSION: No acute cardiopulmonary findings seen to explain patient's cough or shortness of breath. Slightly tortuous thoracic aorta. Mild to moderate hypertrophic changes most marked in the thoracic spine.        Report per radiology    Laboratory:  Labs Ordered and Resulted from Time of ED Arrival to Time of ED Departure   ROUTINE UA WITH MICROSCOPIC REFLEX TO CULTURE - Abnormal       Result Value    Color Urine Yellow      Appearance Urine Slightly Cloudy (*)     Glucose Urine Negative      Bilirubin Urine Negative      Ketones Urine Negative      Specific Gravity Urine 1.022      Blood Urine Small (*)     pH Urine 6.0      Protein Albumin Urine 100 (*)     " Urobilinogen Urine Normal      Nitrite Urine Positive (*)     Leukocyte Esterase Urine Large (*)     Bacteria Urine Many (*)     Mucus Urine Present (*)     RBC Urine 13 (*)     WBC Urine >182 (*)     Hyaline Casts Urine 1     INFLUENZA A/B & SARS-COV2 PCR MULTIPLEX - Abnormal    Influenza A PCR Positive (*)     Influenza B PCR Negative      RSV PCR Negative      SARS CoV2 PCR Negative     BASIC METABOLIC PANEL - Abnormal    Sodium 136      Potassium 4.0      Chloride 104      Carbon Dioxide (CO2) 24      Anion Gap 8      Urea Nitrogen 13      Creatinine 1.12      Calcium 8.7      Glucose 115 (*)     GFR Estimate 74     CBC WITH PLATELETS AND DIFFERENTIAL - Abnormal    WBC Count 11.8 (*)     RBC Count 4.69      Hemoglobin 14.4      Hematocrit 42.4      MCV 90      MCH 30.7      MCHC 34.0      RDW 13.0      Platelet Count 250      % Neutrophils 71      % Lymphocytes 19      % Monocytes 10      % Eosinophils 0      % Basophils 0      % Immature Granulocytes 0      NRBCs per 100 WBC 0      Absolute Neutrophils 8.3      Absolute Lymphocytes 2.2      Absolute Monocytes 1.2      Absolute Eosinophils 0.0      Absolute Basophils 0.0      Absolute Immature Granulocytes 0.0      Absolute NRBCs 0.0     LACTIC ACID WHOLE BLOOD - Normal    Lactic Acid 0.7     URINE CULTURE      Emergency Department Course:      Reviewed:  I reviewed nursing notes, vitals, past medical history and Care Everywhere.    Assessments:  0629 I obtained history and examined the patient as noted above.   0738 I rechecked the patient and explained findings. I believe that they are safe for discharge at this time.    Interventions:  0653 NS 1 L IV  0653 Rocephin 2 g IV  0653 Tylenol 650 mg PO    Disposition:  The patient was discharged to home.     Impression & Plan     Medical Decision Making:  Som Mathews is a 62 year old adult has symptoms consistent with a urinary tract infection.  Urinalysis confirms the infection.  There has been no fever,  significant back/flank pain or significant abdominal pain.  There is no clinical evidence of pyelonephritis, appendicitis,  or diverticulitis.  He was mildly tachycardic initially on arrival.  A septic work-up was undertaken and fortunately no significant leukocytosis, lactic acid elevation or renal insufficiency.  A urine culture is pending.  Of note, the patient's also had symptoms of influenza for the past week.  His viral testing is positive for influenza A.  He is outside the treatment window for influenza.  His oxygenation is good and his chest x-ray is clear.  He has some scattered fine crackles in the left lung base but no other signs of pneumonia.  From the standpoint of the influenza infection, I suspect he is recovering and fortunately he is vaccinated.  I think he is safe for discharge.  He will be started on antibiotics for the urinary tract infection.  First dose was given in the emergency department.  The patient is instructed to return if increasing pain, vomiting, fever, or inability to tolerate the oral antibiotic.  Follow up with primary physician is indicated if not improving in 2-3 days.       Diagnosis:    ICD-10-CM    1. Febrile urinary tract infection  N39.0       2. Influenza A  J10.1          Discharge Medications:  New Prescriptions    CEPHALEXIN (KEFLEX) 500 MG CAPSULE    Take 1 capsule (500 mg) by mouth 2 times daily for 10 days     Scribe Disclosure:  Gypsy TORRES, am serving as a scribe at 6:29 AM on 11/21/2022 to document services personally performed by Madeleine Rausch MD based on my observations and the provider's statements to me.     Madeleine Rausch MD  11/21/22 4743

## 2022-11-21 NOTE — DISCHARGE INSTRUCTIONS
*You may resume diet and activities as tolerated.  *Take medications as prescribed.  Cephalexin as directed.    *Follow up with your doctor in the next 2-3 days for a recheck.,  *Return if you develop fever, back pain, worsening shortness of breath, vomiting, unable to urinate, faint or feel like you will faint or become worse in any way.    Discharge Instructions  Urinary Tract Infection  You have urinary tract infection, or UTI. The urinary tract includes the kidneys (which make urine), ureters (the tubes that carry urine from the kidneys to the bladder), the bladder (which stores urine), and urethra (the tube that carries urine out of the bladder).  Urinary tract infections occur when bacteria travel up the urethra into the bladder. We suspect a UTI based on chemical and microscopic findings in your urine, but if there is a question about your findings, we will do a culture to see if bacteria grow. A urine culture takes several days. You should always follow-up with your primary physician to find out about results of your culture if one was done.   Return to the Emergency Department if:  You have severe back pain.  You are vomiting so that you can t take your medicine, or have signs of dehydration (such as urinating less than 3 times per day).  You have fever over 101.5 degrees F.  You have significant confusion or are very weak, or feel very ill.  Your child seems much more ill, won t wake up, won t respond right, or is crying for a long time and won t calm down.  Your child is showing signs of dehydration, Signs of dehydration can be:  Your infant has had no wet diapers in 4-5 hours.  Your older child has not passed urine in 6-8 hours.  Your infant or child starts to have dry mouth and lips, or no saliva or tears.    Follow-up with your doctor:   Children under 24 months need to be seen by their regular doctor within one week after a diagnosis of a UTI. It may be necessary to do some imaging tests to look at  the child s kidney or bladder.  You should begin to feel better within 24 - 48 hours of starting your antibiotic.  If you do not, you need to be seen again.      Treatment:   You will be treated with an antibiotic to kill the bacteria. We have to make an educated guess as to which antibiotic will work for your infection. In most healthy people, we can guess right almost all of the time. Sometimes a culture is done to show which antibiotics will work. This usually takes 2-3 days. When the culture is done, we may have to contact you to put you on a different antibiotic.  Take a pain medication such as Tylenol  (acetaminophen), Advil  (ibuprofen), Nuprin  (ibuprofen), or Aleve  (naproxen). If you have been given a narcotic such as Vicodin  (hydrocodone with acetaminophen), Percocet  (oxycodone with acetaminophen), or codeine, do not drive for four hours after you have taken it. If the narcotic contains Tylenol  (acetaminophen), do not take Tylenol  with it. All narcotics will cause constipation, so eat a high fiber diet.    Pyridium  (phenazopyridine) or Uristat  (phenazopyridine) is a prescription medication that numbs the bladder to reduce the burning pain of some UTIs.  The same medication is available in a non-prescription version called Azo-Standard  (phenazopyridine), Urodol  (phenazopyridine), or other brand names. This medication will change the color of the urine and tears (usually blue or orange). If you wear contacts, do not wear them while taking this medication as they may be stained by the medication.    Antibiotic Warning:   If you have been placed on antibiotics - watch for signs of allergic reaction.  These include rash, lip swelling, difficulty breathing, wheezing, and dizziness.  If you develop any of these symptoms, stop the antibiotic immediately and go to an emergency room or urgent care for evaluation.    Probiotics: If you have been given an antibiotic, you may want to also take a probiotic pill  "or eat yogurt with live cultures. Probiotics have \"good bacteria\" to help your intestines stay healthy. Studies have shown that probiotics help prevent diarrhea and other intestine problems (including C. diff infection) when you take antibiotics. You can buy these without a prescription in the pharmacy section of the store.   If you were given a prescription for medicine here today, be sure to read all of the information (including the package insert) that comes with your prescription.  This will include important information about the medicine, its side effects, and any warnings that you need to know about.  The pharmacist who fills the prescription can provide more information and answer questions you may have about the medicine.  If you have questions or concerns that the pharmacist cannot address, please call or return to the Emergency Department.   Opioid Medication Information    Pain medications are among the most commonly prescribed medicines, so we are including this information for all our patients. If you did not receive pain medication or get a prescription for pain medicine, you can ignore it.     You may have been given a prescription for an opioid (narcotic) pain medicine and/or have received a pain medicine while here in the Emergency Department. These medicines can make you drowsy or impaired. You must not drive, operate dangerous equipment, or engage in any other dangerous activities while taking these medications. If you drive while taking these medications, you could be arrested for DUI, or driving under the influence. Do not drink any alcohol while you are taking these medications.     Opioid pain medications can cause addiction. If you have a history of chemical dependency of any type, you are at a higher risk of becoming addicted to pain medications.  Only take these prescribed medications to treat your pain when all other options have been tried. Take it for as short a time and as few doses " as possible. Store your pain pills in a secure place, as they are frequently stolen and provide a dangerous opportunity for children or visitors in your house to start abusing these powerful medications. We will not replace any lost or stolen medicine.  As soon as your pain is better, you should flush all your remaining medication.     Many prescription pain medications contain Tylenol  (acetaminophen), including Vicodin , Tylenol #3 , Norco , Lortab , and Percocet .  You should not take any extra pills of Tylenol  if you are using these prescription medications or you can get very sick.  Do not ever take more than 3000 mg of acetaminophen in any 24 hour period.    All opioids tend to cause constipation. Drink plenty of water and eat foods that have a lot of fiber, such as fruits, vegetables, prune juice, apple juice and high fiber cereal.  Take a laxative if you don t move your bowels at least every other day. Miralax , Milk of Magnesia, Colace , or Senna  can be used to keep you regular.      Remember that you can always come back to the Emergency Department if you are not able to see your regular doctor in the amount of time listed above, if you get any new symptoms, or if there is anything that worries you.  Discharge Instructions  Influenza    You were diagnosed today with influenza or influenza like illness.  Influenza is a respiratory illness caused by influenza A or B viruses.  Influenza causes fever, headache, muscle aches, and fatigue.  These symptoms start one to four days after you have been around a person with this illness.  People with influenza commonly have a dry cough, sore throat, and a runny nose.   Influenza is spread through sneezing and coughing and can live on surfaces for several days.  It is usually contagious for 5 days but in some cases up to 10 days and often affects several family members. If you have a family member who is less than 2 years old, older than 65 years old, pregnant or has  a serious medical condition, they should be seen right away by a physician to decide if they should take preventative medications.      Return to the Emergency Department if:  You have trouble breathing.  You develop pain in your chest.  You have signs of being dehydrated, such as dizziness or unable to urinate at least three times daily.  You feel confused.  You cannot stop vomiting or you cannot drink enough fluids.    In children, you should seek help if the child has any of the above or if child:  Has blue or purplish skin color.  Is so irritable that he or she does not want to be held.  Does not have tears when crying (in infants) or does not urinate at least three times daily.  Does not wake up easily.  Follow-up with your doctor if you are not improving after 5 days.    What can I do to help myself?  Rest.  Fluids -- Drink hydrating solutions such as Gatorade  or Pedialyte  as often as you can. If you are drinking enough, you should pass urine at least every eight hours.  Tylenol  (acetaminophen) and Advil  (ibuprofen) can relieve fever, headache, and muscle aches. Do not give aspirin to children under 18 years old.   Antiviral treatment -- Antiviral medicines do not make the flu symptoms go away immediately.  They have only been shown to make the symptoms go away 12 to 24 hours sooner than they would without treatment.     Antibiotics -- Antibiotics are NOT useful for treating viral illnesses such as influenza. Antibiotics should only be used if there is a bacterial complication of the flu such as bacterial pneumonia, ear infection, or sinusitis.  Because you were diagnosed with a flu like illness you are very contagious.  This means you cannot work, attend school or  for at least 24 hours or until you no longer have a fever.  If you were given a prescription for medicine here today, be sure to read all of the information (including the package insert) that comes with your prescription.  This will  include important information about the medicine, its side effects, and any warnings that you need to know about.  The pharmacist who fills the prescription can provide more information and answer questions you may have about the medicine.  If you have questions or concerns that the pharmacist cannot address, please call or return to the Emergency Department.   Opioid Medication Information    Pain medications are among the most commonly prescribed medicines, so we are including this information for all our patients. If you did not receive pain medication or get a prescription for pain medicine, you can ignore it.     You may have been given a prescription for an opioid (narcotic) pain medicine and/or have received a pain medicine while here in the Emergency Department. These medicines can make you drowsy or impaired. You must not drive, operate dangerous equipment, or engage in any other dangerous activities while taking these medications. If you drive while taking these medications, you could be arrested for DUI, or driving under the influence. Do not drink any alcohol while you are taking these medications.     Opioid pain medications can cause addiction. If you have a history of chemical dependency of any type, you are at a higher risk of becoming addicted to pain medications.  Only take these prescribed medications to treat your pain when all other options have been tried. Take it for as short a time and as few doses as possible. Store your pain pills in a secure place, as they are frequently stolen and provide a dangerous opportunity for children or visitors in your house to start abusing these powerful medications. We will not replace any lost or stolen medicine.  As soon as your pain is better, you should flush all your remaining medication.     Many prescription pain medications contain Tylenol  (acetaminophen), including Vicodin , Tylenol #3 , Norco , Lortab , and Percocet .  You should not take any extra  pills of Tylenol  if you are using these prescription medications or you can get very sick.  Do not ever take more than 3000 mg of acetaminophen in any 24 hour period.    All opioids tend to cause constipation. Drink plenty of water and eat foods that have a lot of fiber, such as fruits, vegetables, prune juice, apple juice and high fiber cereal.  Take a laxative if you don t move your bowels at least every other day. Miralax , Milk of Magnesia, Colace , or Senna  can be used to keep you regular.      Remember that you can always come back to the Emergency Department if you are not able to see your regular doctor in the amount of time listed above, if you get any new symptoms, or if there is anything that worries you.

## 2022-11-21 NOTE — TELEPHONE ENCOUNTER
Has flu symptoms for over a week now  Getting strong on Tuesday  Getting worse   Very congested in the head  Cough   Sore throat   Headache  Lots of thick phlegm, difficult to get it loose   -alternating in color between clear and very thick yellow green and sometimes bloody  Fever  Chills starting at 7pm  For the first couple of days he had some pain with breathing but never any difficulty breathing     Has been drinking lots of fluid and resting    In the last day and a half has started feeling the need to urinate and not much is coming out   -getting worse  -now there is pressure  -getting to be smaller and smaller amounts of urine  -about a 1/4 cup of urine at a time  -urgency is severe    Has been taking ibuprofen/tylenol for fever  -took 1000mg tylenol at 6pm    Woke up due to feeling really hot, felt like he was burning up  -Fever seems to be getting higher  -does not have a thermometer  -thinks it has been low grade for a few days     Triaged to a disposition of See a provider within 4 hours. Nursing judgement used and advised patient to be seen in ED within 4 hours.     Reason for Disposition    [1] Fever > 101 F (38.3 C) AND [2] age > 60 years    Fever > 100.4 F (38.0 C)    Urinating more frequently than usual (i.e., frequency)    Additional Information    Negative: SEVERE difficulty breathing (e.g., struggling for each breath, speaks in single words)    Negative: Sounds like a life-threatening emergency to the triager    Negative: [1] Difficulty breathing AND [2] not from stuffy nose (e.g., not relieved by cleaning out the nose)    Negative: Runny nose is caused by pollen or other allergies    Negative: Cough is main symptom    Negative: Severe sore throat    Negative: Fever > 104 F (40 C)    Negative: Patient sounds very sick or weak to the triager    Negative: Shock suspected (e.g., cold/pale/clammy skin, too weak to stand, low BP, rapid pulse)    Negative: Sounds like a life-threatening emergency to  the triager    Negative: Followed a male genital area injury (e.g., penis, scrotum)    Negative: Pus (white, yellow) or bloody discharge from end of penis    Negative: [1] Taking antibiotic for urinary tract infection (UTI) AND [2] male    Negative: [1] Pain or burning with passing urine (urination) AND [2] male    Negative: Pain in scrotum is main symptom    Negative: Blood in the urine is main symptom    Negative: Symptoms arising from use of a urinary catheter (e.g., coude, Nevarez)    Negative: [1] Decreased urination and [2] drinking very little AND [2] dehydration suspected (e.g., dark urine, no urine > 12 hours, very dry mouth, very lightheaded)    Negative: Patient sounds very sick or weak to the triager    Negative: [1] Unable to urinate (or only a few drops) > 4 hours AND [2] bladder feels very full (e.g., palpable bladder or strong urge to urinate)    Protocols used: COMMON COLD-A-, URINARY SYMPTOMS-A-AH    Naty Barros RN on 11/21/2022 at 2:43 AM

## 2022-11-22 LAB — BACTERIA UR CULT: ABNORMAL

## 2022-11-22 NOTE — RESULT ENCOUNTER NOTE
[facility] Emergency Dept discharge antibiotic (if prescribed): Cephalexin (Keflex) 500 mg capsule, 1 capsule (500 mg) by mouth 2 times daily for 10 days.   Date of Rx (if applicable):  11/21/22  No changes in treatment per [facility] ED Lab Result Urine culture protocol.

## 2022-11-23 NOTE — RESULT ENCOUNTER NOTE
Final Urine Culture Report on 11/22/22  [facility] Emergency Dept discharge antibiotic prescribed: Cephalexin (Keflex) 500 mg capsule, 1 capsule (500 mg) by mouth 2 times daily for 10 days.  #1. Bacteria, >100,000 CFU/ML Escherichia coli , is SUSCEPTIBLE to Antibiotic.    No change in treatment per [facility] ED lab result Urine Culture protocol.

## 2023-01-28 ENCOUNTER — HEALTH MAINTENANCE LETTER (OUTPATIENT)
Age: 63
End: 2023-01-28

## 2023-07-01 ENCOUNTER — OFFICE VISIT (OUTPATIENT)
Dept: FAMILY MEDICINE | Facility: CLINIC | Age: 63
End: 2023-07-01
Payer: COMMERCIAL

## 2023-07-01 VITALS
HEART RATE: 85 BPM | TEMPERATURE: 98.3 F | OXYGEN SATURATION: 96 % | SYSTOLIC BLOOD PRESSURE: 128 MMHG | DIASTOLIC BLOOD PRESSURE: 84 MMHG

## 2023-07-01 DIAGNOSIS — R30.0 DYSURIA: Primary | ICD-10-CM

## 2023-07-01 LAB
ALBUMIN UR-MCNC: 100 MG/DL
APPEARANCE UR: ABNORMAL
BACTERIA #/AREA URNS HPF: ABNORMAL /HPF
BILIRUB UR QL STRIP: NEGATIVE
COLOR UR AUTO: YELLOW
GLUCOSE UR STRIP-MCNC: NEGATIVE MG/DL
HGB UR QL STRIP: ABNORMAL
KETONES UR STRIP-MCNC: NEGATIVE MG/DL
LEUKOCYTE ESTERASE UR QL STRIP: ABNORMAL
NITRATE UR QL: NEGATIVE
PH UR STRIP: 5.5 [PH] (ref 5–8)
RBC #/AREA URNS AUTO: ABNORMAL /HPF
SP GR UR STRIP: 1.02 (ref 1–1.03)
SQUAMOUS #/AREA URNS AUTO: ABNORMAL /LPF
UROBILINOGEN UR STRIP-ACNC: 0.2 E.U./DL
WBC #/AREA URNS AUTO: ABNORMAL /HPF
WBC CLUMPS #/AREA URNS HPF: PRESENT /HPF

## 2023-07-01 PROCEDURE — 87088 URINE BACTERIA CULTURE: CPT | Performed by: NURSE PRACTITIONER

## 2023-07-01 PROCEDURE — 81001 URINALYSIS AUTO W/SCOPE: CPT | Performed by: NURSE PRACTITIONER

## 2023-07-01 PROCEDURE — 99213 OFFICE O/P EST LOW 20 MIN: CPT | Performed by: NURSE PRACTITIONER

## 2023-07-01 PROCEDURE — 87186 SC STD MICRODIL/AGAR DIL: CPT | Performed by: NURSE PRACTITIONER

## 2023-07-01 PROCEDURE — 87086 URINE CULTURE/COLONY COUNT: CPT | Performed by: NURSE PRACTITIONER

## 2023-07-01 RX ORDER — SULFAMETHOXAZOLE/TRIMETHOPRIM 800-160 MG
1 TABLET ORAL 2 TIMES DAILY
Qty: 10 TABLET | Refills: 0 | Status: SHIPPED | OUTPATIENT
Start: 2023-07-01 | End: 2023-07-06

## 2023-07-01 NOTE — PROGRESS NOTES
Assessment & Plan     Dysuria  - UA Macroscopic with reflex to Microscopic and Culture  - Urine Microscopic Exam  - Urine Culture  - sulfamethoxazole-trimethoprim (BACTRIM DS) 800-160 MG tablet  Dispense: 10 tablet; Refill: 0       Patient Instructions     Results for orders placed or performed in visit on 07/01/23   UA Macroscopic with reflex to Microscopic and Culture     Status: Abnormal    Specimen: Urine, Clean Catch   Result Value Ref Range    Color Urine Yellow Colorless, Straw, Light Yellow, Yellow    Appearance Urine Turbid (A) Clear    Glucose Urine Negative Negative mg/dL    Bilirubin Urine Negative Negative    Ketones Urine Negative Negative mg/dL    Specific Gravity Urine 1.025 1.005 - 1.030    Blood Urine Small (A) Negative    pH Urine 5.5 5.0 - 8.0    Protein Albumin Urine 100 (A) Negative mg/dL    Urobilinogen Urine 0.2 0.2, 1.0 E.U./dL    Nitrite Urine Negative Negative    Leukocyte Esterase Urine Large (A) Negative   Urine Microscopic Exam     Status: Abnormal   Result Value Ref Range    Bacteria Urine Moderate (A) None Seen /HPF    RBC Urine 5-10 (A) 0-2 /HPF /HPF    WBC Urine  (A) 0-5 /HPF /HPF    Squamous Epithelials Urine Few (A) None Seen /LPF    WBC Clumps Urine Present (A) None Seen /HPF       UC pending  Push fluids  Pyridium PRN - urine will be dark orange   Antibiotics Bactrim for 5 days  Will call if bacteria is resistant to the antibiotic prescribed.    F/u in 1 week if persists or 3 days if worsening.           Return in about 1 week (around 7/8/2023) for with regular provider if symptoms persist.    [facility]    Racehl Suarez is a 62 year old adult who presents to clinic today for the following health issues:  Chief Complaint   Patient presents with     Urinary Problem     Feeling as though bladder is not emptying all the way. Concerned about prostate.      HPI    UTI    Onset of symptoms was 2day(s).  Course of  illness is worsening  Severity moderate  Current and associated symptoms dysuria, frequency, urgency and burning  Treatment and measures tried Increase fluid intake  Predisposing factors include none  Patient denies rigors, flank pain and temperature > 101 degrees F.        Review of Systems  Constitutional, HEENT, cardiovascular, pulmonary, GI, , musculoskeletal, neuro, skin, endocrine and psych systems are negative, except as otherwise noted.      Objective    /84   Pulse 85   Temp 98.3  F (36.8  C) (Oral)   SpO2 96%   Physical Exam   GENERAL: healthy, alert and no distress  RESP: lungs clear to auscultation - no rales, rhonchi or wheezes  CV: regular rate and rhythm, normal S1 S2, no S3 or S4, no murmur, click or rub, no peripheral edema and peripheral pulses strong  ABDOMEN: soft, nontender, no hepatosplenomegaly, no masses and bowel sounds normal  MS: no gross musculoskeletal defects noted, no edema  BACK: no CVA tenderness, no paralumbar tenderness

## 2023-07-01 NOTE — PATIENT INSTRUCTIONS
Results for orders placed or performed in visit on 07/01/23   UA Macroscopic with reflex to Microscopic and Culture     Status: Abnormal    Specimen: Urine, Clean Catch   Result Value Ref Range    Color Urine Yellow Colorless, Straw, Light Yellow, Yellow    Appearance Urine Turbid (A) Clear    Glucose Urine Negative Negative mg/dL    Bilirubin Urine Negative Negative    Ketones Urine Negative Negative mg/dL    Specific Gravity Urine 1.025 1.005 - 1.030    Blood Urine Small (A) Negative    pH Urine 5.5 5.0 - 8.0    Protein Albumin Urine 100 (A) Negative mg/dL    Urobilinogen Urine 0.2 0.2, 1.0 E.U./dL    Nitrite Urine Negative Negative    Leukocyte Esterase Urine Large (A) Negative   Urine Microscopic Exam     Status: Abnormal   Result Value Ref Range    Bacteria Urine Moderate (A) None Seen /HPF    RBC Urine 5-10 (A) 0-2 /HPF /HPF    WBC Urine  (A) 0-5 /HPF /HPF    Squamous Epithelials Urine Few (A) None Seen /LPF    WBC Clumps Urine Present (A) None Seen /HPF       UC pending  Push fluids  Pyridium PRN - urine will be dark orange   Antibiotics Bactrim for 5 days  Will call if bacteria is resistant to the antibiotic prescribed.    F/u in 1 week if persists or 3 days if worsening.

## 2023-07-02 LAB — BACTERIA UR CULT: ABNORMAL

## 2023-07-03 ENCOUNTER — TELEPHONE (OUTPATIENT)
Dept: FAMILY MEDICINE | Facility: CLINIC | Age: 63
End: 2023-07-03
Payer: COMMERCIAL

## 2023-07-03 DIAGNOSIS — N39.0 URINARY TRACT INFECTION IN MALE: Primary | ICD-10-CM

## 2023-07-03 NOTE — TELEPHONE ENCOUNTER
----- Message from Reina Tirado PA-C sent at 7/3/2023  2:35 PM CDT -----  Please contact patient.  Urine culture is positive for enterococcus faecalis.  Sensitivities reveal that we need to change the antibiotic.  Please have him stop the Septra and start Augmentin 1 po BID for 7 days.  I have sent it to his pharmacy.  Please also have him follow up with his primary clinic for a recheck in 2 weeks to make certain that the urinary tract infection has completely cleared.    Reina Tirado MARCELA JUSTIN

## 2023-07-03 NOTE — TELEPHONE ENCOUNTER
Call and spoke with patient and message was given. No questions and patient verbalized understanding.    Kelsy Grande on 7/3/2023 at 4:02 PM

## 2023-07-03 NOTE — RESULT ENCOUNTER NOTE
Please contact patient.  Urine culture is positive for enterococcus faecalis.  Sensitivities reveal that we need to change the antibiotic.  Please have him stop the Septra and start Augmentin 1 po BID for 7 days.  I have sent it to his pharmacy.  Please also have him follow up with his primary clinic for a recheck in 2 weeks to make certain that the urinary tract infection has completely cleared.    Reina BETHEA, PANicholeC

## 2023-08-10 ENCOUNTER — ANCILLARY PROCEDURE (OUTPATIENT)
Dept: GENERAL RADIOLOGY | Facility: CLINIC | Age: 63
End: 2023-08-10
Attending: FAMILY MEDICINE
Payer: COMMERCIAL

## 2023-08-10 ENCOUNTER — OFFICE VISIT (OUTPATIENT)
Dept: FAMILY MEDICINE | Facility: CLINIC | Age: 63
End: 2023-08-10
Payer: COMMERCIAL

## 2023-08-10 VITALS
TEMPERATURE: 98.8 F | WEIGHT: 315 LBS | DIASTOLIC BLOOD PRESSURE: 74 MMHG | RESPIRATION RATE: 24 BRPM | HEART RATE: 104 BPM | BODY MASS INDEX: 45.1 KG/M2 | HEIGHT: 70 IN | OXYGEN SATURATION: 96 % | SYSTOLIC BLOOD PRESSURE: 131 MMHG

## 2023-08-10 DIAGNOSIS — R73.09 ELEVATED GLUCOSE: ICD-10-CM

## 2023-08-10 DIAGNOSIS — G89.29 CHRONIC PAIN OF BOTH KNEES: ICD-10-CM

## 2023-08-10 DIAGNOSIS — R60.0 BILATERAL LOWER EXTREMITY EDEMA: ICD-10-CM

## 2023-08-10 DIAGNOSIS — E66.01 MORBID OBESITY (H): ICD-10-CM

## 2023-08-10 DIAGNOSIS — M25.561 CHRONIC PAIN OF BOTH KNEES: Primary | ICD-10-CM

## 2023-08-10 DIAGNOSIS — R79.89 ELEVATED FERRITIN: ICD-10-CM

## 2023-08-10 DIAGNOSIS — M25.562 CHRONIC PAIN OF BOTH KNEES: ICD-10-CM

## 2023-08-10 DIAGNOSIS — M25.561 CHRONIC PAIN OF BOTH KNEES: ICD-10-CM

## 2023-08-10 DIAGNOSIS — M25.562 CHRONIC PAIN OF BOTH KNEES: Primary | ICD-10-CM

## 2023-08-10 DIAGNOSIS — M25.361 INSTABILITY OF RIGHT KNEE JOINT: ICD-10-CM

## 2023-08-10 DIAGNOSIS — G89.29 CHRONIC PAIN OF BOTH KNEES: Primary | ICD-10-CM

## 2023-08-10 DIAGNOSIS — L81.9 PIGMENTED SKIN LESIONS: ICD-10-CM

## 2023-08-10 DIAGNOSIS — M17.0 TRICOMPARTMENT OSTEOARTHRITIS OF BOTH KNEES: ICD-10-CM

## 2023-08-10 DIAGNOSIS — G25.81 RESTLESS LEG SYNDROME: ICD-10-CM

## 2023-08-10 LAB
ALBUMIN SERPL BCG-MCNC: 4.3 G/DL (ref 3.5–5.2)
ALP SERPL-CCNC: 85 U/L (ref 35–129)
ALT SERPL W P-5'-P-CCNC: 36 U/L (ref 0–70)
ANION GAP SERPL CALCULATED.3IONS-SCNC: 13 MMOL/L (ref 7–15)
AST SERPL W P-5'-P-CCNC: 33 U/L (ref 0–45)
BILIRUB SERPL-MCNC: 0.4 MG/DL
BUN SERPL-MCNC: 14.3 MG/DL (ref 8–23)
CALCIUM SERPL-MCNC: 9.2 MG/DL (ref 8.8–10.2)
CHLORIDE SERPL-SCNC: 102 MMOL/L (ref 98–107)
CREAT SERPL-MCNC: 0.99 MG/DL (ref 0.51–1.17)
DEPRECATED HCO3 PLAS-SCNC: 26 MMOL/L (ref 22–29)
FERRITIN SERPL-MCNC: 652 NG/ML (ref 11–409)
GFR SERPL CREATININE-BSD FRML MDRD: 86 ML/MIN/1.73M2
GLUCOSE SERPL-MCNC: 139 MG/DL (ref 70–99)
MAGNESIUM SERPL-MCNC: 2 MG/DL (ref 1.7–2.3)
POTASSIUM SERPL-SCNC: 4.3 MMOL/L (ref 3.4–5.3)
PROT SERPL-MCNC: 7.9 G/DL (ref 6.4–8.3)
SODIUM SERPL-SCNC: 141 MMOL/L (ref 136–145)

## 2023-08-10 PROCEDURE — 80053 COMPREHEN METABOLIC PANEL: CPT | Performed by: FAMILY MEDICINE

## 2023-08-10 PROCEDURE — 83550 IRON BINDING TEST: CPT | Performed by: FAMILY MEDICINE

## 2023-08-10 PROCEDURE — 83540 ASSAY OF IRON: CPT | Performed by: FAMILY MEDICINE

## 2023-08-10 PROCEDURE — 99214 OFFICE O/P EST MOD 30 MIN: CPT | Performed by: FAMILY MEDICINE

## 2023-08-10 PROCEDURE — 36415 COLL VENOUS BLD VENIPUNCTURE: CPT | Performed by: FAMILY MEDICINE

## 2023-08-10 PROCEDURE — 82728 ASSAY OF FERRITIN: CPT | Performed by: FAMILY MEDICINE

## 2023-08-10 PROCEDURE — 83735 ASSAY OF MAGNESIUM: CPT | Performed by: FAMILY MEDICINE

## 2023-08-10 PROCEDURE — 73562 X-RAY EXAM OF KNEE 3: CPT | Mod: TC | Performed by: RADIOLOGY

## 2023-08-10 NOTE — PROGRESS NOTES
Assessment & Plan     1. Chronic pain of both knees  - XR Knee Bilateral 3 Views; Future    Evaluate for osteoarthritis.  Offered prescription NSAID, offered physical therapy referral.  Pain is actually improving.  Recommend weight loss.  Will obtain x-rays today.    2. Instability of right knee joint  - XR Knee Bilateral 3 Views; Future    Instability right knee last few weeks, remote injuries, no known acute injuries.  Low threshold to proceed with MRI.  Suspect possible degenerative meniscus disease versus ligamentous injury.    3. Bilateral lower extremity edema  - Adult Comprehensive Weight Management  Referral; Future  - US Venous Competency Bilateral; Future  - Comprehensive metabolic panel (BMP + Alb, Alk Phos, ALT, AST, Total. Bili, TP)    Bilateral lower extremity edema with some venous stasis changes.  Rule out kidney disease and liver disease.  Evaluate for venous insufficiency with venous ultrasound.  Encouraged compression, ambulation, low-salt diet, sufficient protein, strongly encouraged weight loss.  He is agreeable to referral for comprehensive weight management.    4. Morbid obesity (H)  - Adult Comprehensive Weight Management  Referral; Future    BMI 48.  Trying lifestyle modifications without improvement.  Interested in working with comprehensive weight management.  I think this would be helpful given comorbid osteoarthritis and lower extremity edema symptoms.    5. Restless leg syndrome  - Ferritin  - Comprehensive metabolic panel (BMP + Alb, Alk Phos, ALT, AST, Total. Bili, TP)  - Magnesium    Symptoms at night, urge to move.  Suspect restless leg symptoms.  Rule out electrolyte abnormalities and iron deficiency.    6. Pigmented skin lesions  - Adult Dermatology Referral; Future      Several atypical pigmented skin lesions, recommend evaluation with dermatology.  Referral placed.    Ashley Pittman, [facility]    31 minutes spent on date of  encounter with chart review, patient visit, counseling, and documentation.    Subjective   Erick is a 62 year old, presenting for the following health issues:  Knee Pain (Bilateral knee pain.  No known inj, but does have hx of knee injuries.  ), Leg Problem (Leg weakness.  Tingling, numbness, discoloration.  ), Derm Problem (Has a spot on his back that is itchy.  Hx of cyst.  Would like it checked out. ), and Respiratory Problems (Positive for COVID back in march with no symptoms, but seems to be more short of breath with activity.  Not sure if related to working harder due to his knees. )      History of Present Illness       Reason for visit:  Knee and ankle pain  Symptom onset:  More than a month  Symptom intensity:  Moderate  Symptom progression:  Staying the same  Had these symptoms before:  No  What makes it worse:  Walking and standing for long enough periods  What makes it better:  Sitting and lying down and massaging    He eats 0-1 servings of fruits and vegetables daily.He consumes 0 sweetened beverage(s) daily.He exercises with enough effort to increase his heart rate 10 to 19 minutes per day.  He exercises with enough effort to increase his heart rate 3 or less days per week.   He is taking medications regularly.       KNEE PAIN: About 6 weeks ago, started to have right knee stiffness and pain. Sometimes hard to bend all the way. Sometimes hard to get up and out of chair, knee felt unstable. Hx of old injuries from sports. Doesn't remember any particular injuries recently. Pain started to gradually improve. When sitting or getting up, is feeling unstable. Has sensation that knee is going to give out on him. Hx of a ruptured tendon with atrophy in his thigh (quad). Hx mensicus injuries. Experiencing clicking in right knee as well. Favoring the right knee, left knee becoming more painful.    No recent knee imaging, maybe a few decades ago?     Has been using glucosamine chondroitin and has been a little  "helpful.    No focal effusions, warmth, or redness.       ANKLES: for 1-2 years, worse over the last few months, feels like he isn't getting good circulation. Noticing more discoloration of his ankles.     No crampy or achy pain in the legs regularly. Had symptoms at night when he had been more active.       WEAKNESS: Ankles are feeling weak, hard to stand for a long period of time.        NUMBNESS: Toes are feeling more sleepy.  Feels like ants on his toes, improves with movement.       DISCOLORATION: bilateral ankles.        DERM: Hx of a growth on his back with pus in it, hardened, previously drained. Experiencing a sensation on his back, worried about recurrence.       OBESITY: Reports gradual, progressive weight gain over the last few years, pretty sedentary at work, administrative/computer work, lots of stress.  Trying to make dietary changes, decreased soda intake.       BREATHING: Wondering if post covid related? Had a positive test back in March, easily winded. Twice in the last 4-5 years, has had episodes of shortness of breath and chest pains. Was evaluated in the ED, thought possibly panic mediated?    Sometimes has sensation of heart racing, more so winded. 1 flight of stairs will make him feel more winded.   Currently biking and this is going well.       RECURRENT UTIS: Has had a few bouts recently, thinks related to dehydration, trying to stay more hydrated.       Review of Systems   See HPI above.       Objective    /74   Pulse 104   Temp 98.8  F (37.1  C) (Oral)   Resp 24   Ht 1.784 m (5' 10.25\")   Wt (!) 154.9 kg (341 lb 6.4 oz)   SpO2 96%   BMI 48.64 kg/m    Body mass index is 48.64 kg/m .  Physical Exam   GENERAL: healthy, alert and no distress  NECK: no adenopathy, no asymmetry, masses, or scars and thyroid normal to palpation  RESP: lungs clear to auscultation - no rales, rhonchi or wheezes  CV: regular rate and rhythm, normal S1 S2, no S3 or S4, no murmur, click or rub, no " peripheral edema and peripheral pulses strong  ABDOMEN: soft, nontender, no hepatosplenomegaly, no masses and bowel sounds normal  MS: Bilateral trace lower extremity edema, venous stasis color changes, pulses intact, normal capillary refill, feet are warm to touch.  SKIN: Multiple pigmented macules with color variation.

## 2023-08-11 LAB
IRON BINDING CAPACITY (ROCHE): 272 UG/DL (ref 240–430)
IRON SATN MFR SERPL: 34 % (ref 15–46)
IRON SERPL-MCNC: 93 UG/DL (ref 37–157)

## 2023-08-11 NOTE — RESULT ENCOUNTER NOTE
Patient updated by DreamDryt message with imaging results.       Rubén Suarez,  Your knee x-ray does show moderate arthritis. I would continue with scheduled ibuprofen for pain management and ice as needed. I would also recommend physical therapy referral which I will place. Weight loss will help offload pressure on the joint.  Given prior knee injuries and sensation of instability, I would like to proceed with MRI to evaluate the ligaments and meniscus better. Orders placed.  Please reach out with follow up questions or concerns.  Ashley Pittman, DO

## 2023-08-11 NOTE — RESULT ENCOUNTER NOTE
Patient updated by Bluechilli message with lab results.       Rubén Suarez,  Your labs have returned and overall look okay.  You do not appear to have an iron deficiency as your ferritin level is elevated. We see this with other inflammatory states. This is likely related to your obesity, but I am adding on additional iron studies to help evaluate for other causes.   Your blood sugar is elevated. I do not see that we have checked an A1c in the past. I am unfortunately not able to add on an A1c with the tests they smooth the other day, but I have placed an order for this test. Please schedule a lab only appointment at your convenience so we can further investigate your blood sugar levels. The A1c is helpful because it is representative of your average blood sugar over a longer period of time. We need to see if your A1c is in the prediabetes or diabetes range.  Remaining labs look good. Electrolytes are normal as well as liver enzymes and kidney function.  Reach out by Bluechilli with follow up questions or concerns. Schedule a lab appointment at your convenience. I will send an additional message when your additional iron studies return.  Ashley Pittman, DO

## 2023-08-12 NOTE — RESULT ENCOUNTER NOTE
Patient updated by AfterYes message with lab results.       The iron studies have returned and show that you do NOT have iron overload. Please proceed with the A1c as discussed in the last message to rule out diabetes as a contributing factor. If you are regularly consuming alcohol, cutting back will help decrease your ferritin level. Work on weight management as discussed at the appointment. I would discuss your ferritin results with Dr. Galicia at your upcoming visit as well.  Please reach out with any follow up questions or concerns.  Ashley Pittman, DO

## 2023-09-05 ENCOUNTER — TELEPHONE (OUTPATIENT)
Dept: AUDIOLOGY | Facility: CLINIC | Age: 63
End: 2023-09-05
Payer: COMMERCIAL

## 2023-09-05 NOTE — TELEPHONE ENCOUNTER
Returned patient's call from earlier today; left device does not seem to be working. Advised patient to drop off device at the  of either the [place] or [facility] to be checked. Should it need to be sent to the  for repair, there will be no charge as the devices are still in 's warranty through 6-5-25. Patient expressed verbal understanding.    John Deutsch, [facility]  [place] Licensed Audiologist [address] [place] [address] Call Center    Phone Message    May a detailed message be left on voicemail: yes     Reason for Call: Other: Patient states one of hearing aids in not turning on. Advised of drop off service stated wanted to talk with office first. Please contact to advise. Thank you      Action Taken: Other: AUD    Travel Screening: Not Applicable

## 2023-09-06 NOTE — TELEPHONE ENCOUNTER
Patient called back was able to get piece working and will no longer be dropping it off. Thank you

## 2023-10-30 ENCOUNTER — OFFICE VISIT (OUTPATIENT)
Dept: FAMILY MEDICINE | Facility: CLINIC | Age: 63
End: 2023-10-30
Payer: COMMERCIAL

## 2023-10-30 VITALS
DIASTOLIC BLOOD PRESSURE: 85 MMHG | TEMPERATURE: 98.5 F | RESPIRATION RATE: 20 BRPM | WEIGHT: 315 LBS | HEART RATE: 85 BPM | HEIGHT: 70 IN | SYSTOLIC BLOOD PRESSURE: 139 MMHG | OXYGEN SATURATION: 100 % | BODY MASS INDEX: 45.1 KG/M2

## 2023-10-30 DIAGNOSIS — M17.0 TRICOMPARTMENT OSTEOARTHRITIS OF BOTH KNEES: ICD-10-CM

## 2023-10-30 DIAGNOSIS — R39.198 DECREASED URINE STREAM: ICD-10-CM

## 2023-10-30 DIAGNOSIS — E66.01 OBESITY, CLASS III, BMI 40-49.9 (MORBID OBESITY) (H): ICD-10-CM

## 2023-10-30 DIAGNOSIS — R20.0 NUMBNESS AND TINGLING OF BOTH LEGS: ICD-10-CM

## 2023-10-30 DIAGNOSIS — R06.02 SHORTNESS OF BREATH: ICD-10-CM

## 2023-10-30 DIAGNOSIS — R73.09 ELEVATED GLUCOSE: ICD-10-CM

## 2023-10-30 DIAGNOSIS — N39.0 RECURRENT UTI: ICD-10-CM

## 2023-10-30 DIAGNOSIS — Z00.00 ROUTINE PHYSICAL EXAMINATION: Primary | ICD-10-CM

## 2023-10-30 DIAGNOSIS — R20.2 NUMBNESS AND TINGLING OF BOTH LEGS: ICD-10-CM

## 2023-10-30 DIAGNOSIS — R60.0 PERIPHERAL EDEMA: ICD-10-CM

## 2023-10-30 LAB
ALBUMIN UR-MCNC: 100 MG/DL
APPEARANCE UR: CLEAR
BACTERIA #/AREA URNS HPF: ABNORMAL /HPF
BILIRUB UR QL STRIP: NEGATIVE
COLOR UR AUTO: YELLOW
ERYTHROCYTE [DISTWIDTH] IN BLOOD BY AUTOMATED COUNT: 13.2 % (ref 10–15)
GLUCOSE UR STRIP-MCNC: NEGATIVE MG/DL
HBA1C MFR BLD: 5.6 % (ref 0–5.6)
HCT VFR BLD AUTO: 44.4 % (ref 35–53)
HGB BLD-MCNC: 15.2 G/DL (ref 11.7–17.7)
HGB UR QL STRIP: NEGATIVE
HYALINE CASTS #/AREA URNS LPF: ABNORMAL /LPF
KETONES UR STRIP-MCNC: NEGATIVE MG/DL
LEUKOCYTE ESTERASE UR QL STRIP: NEGATIVE
MCH RBC QN AUTO: 31.3 PG (ref 26.5–33)
MCHC RBC AUTO-ENTMCNC: 34.2 G/DL (ref 31.5–36.5)
MCV RBC AUTO: 91 FL (ref 78–100)
MUCOUS THREADS #/AREA URNS LPF: PRESENT /LPF
NITRATE UR QL: NEGATIVE
PH UR STRIP: 5.5 [PH] (ref 5–8)
PLATELET # BLD AUTO: 267 10E3/UL (ref 150–450)
RBC # BLD AUTO: 4.86 10E6/UL (ref 3.8–5.9)
RBC #/AREA URNS AUTO: ABNORMAL /HPF
SP GR UR STRIP: 1.02 (ref 1–1.03)
SQUAMOUS #/AREA URNS AUTO: ABNORMAL /LPF
UROBILINOGEN UR STRIP-ACNC: 0.2 E.U./DL
WBC # BLD AUTO: 8.5 10E3/UL (ref 4–11)
WBC #/AREA URNS AUTO: ABNORMAL /HPF
WBC CLUMPS #/AREA URNS HPF: PRESENT /HPF

## 2023-10-30 PROCEDURE — 80061 LIPID PANEL: CPT | Performed by: FAMILY MEDICINE

## 2023-10-30 PROCEDURE — 99396 PREV VISIT EST AGE 40-64: CPT | Mod: 25 | Performed by: FAMILY MEDICINE

## 2023-10-30 PROCEDURE — G0103 PSA SCREENING: HCPCS | Performed by: FAMILY MEDICINE

## 2023-10-30 PROCEDURE — 83880 ASSAY OF NATRIURETIC PEPTIDE: CPT | Performed by: FAMILY MEDICINE

## 2023-10-30 PROCEDURE — 90471 IMMUNIZATION ADMIN: CPT | Performed by: FAMILY MEDICINE

## 2023-10-30 PROCEDURE — 81001 URINALYSIS AUTO W/SCOPE: CPT | Performed by: FAMILY MEDICINE

## 2023-10-30 PROCEDURE — 36415 COLL VENOUS BLD VENIPUNCTURE: CPT | Performed by: FAMILY MEDICINE

## 2023-10-30 PROCEDURE — 83036 HEMOGLOBIN GLYCOSYLATED A1C: CPT | Performed by: FAMILY MEDICINE

## 2023-10-30 PROCEDURE — 90750 HZV VACC RECOMBINANT IM: CPT | Performed by: FAMILY MEDICINE

## 2023-10-30 PROCEDURE — 80048 BASIC METABOLIC PNL TOTAL CA: CPT | Performed by: FAMILY MEDICINE

## 2023-10-30 PROCEDURE — 90480 ADMN SARSCOV2 VAC 1/ONLY CMP: CPT | Performed by: FAMILY MEDICINE

## 2023-10-30 PROCEDURE — 84443 ASSAY THYROID STIM HORMONE: CPT | Performed by: FAMILY MEDICINE

## 2023-10-30 PROCEDURE — 85027 COMPLETE CBC AUTOMATED: CPT | Performed by: FAMILY MEDICINE

## 2023-10-30 PROCEDURE — 99213 OFFICE O/P EST LOW 20 MIN: CPT | Mod: 25 | Performed by: FAMILY MEDICINE

## 2023-10-30 PROCEDURE — 91320 SARSCV2 VAC 30MCG TRS-SUC IM: CPT | Performed by: FAMILY MEDICINE

## 2023-10-30 ASSESSMENT — ENCOUNTER SYMPTOMS
CONSTIPATION: 1
HEADACHES: 0
HEMATURIA: 0
DIZZINESS: 0
DIARRHEA: 0
SORE THROAT: 0
FEVER: 0
SHORTNESS OF BREATH: 0
WEAKNESS: 0
COUGH: 0
HEMATOCHEZIA: 0
CHILLS: 0
FREQUENCY: 1
ABDOMINAL PAIN: 0
NAUSEA: 0
MYALGIAS: 0
PARESTHESIAS: 0
EYE PAIN: 0
NERVOUS/ANXIOUS: 1
HEARTBURN: 0
JOINT SWELLING: 1
PALPITATIONS: 0
DYSURIA: 0
ARTHRALGIAS: 1

## 2023-10-30 NOTE — PROGRESS NOTES
"SUBJECTIVE:   CC: Erick is an 62 year old who presents for preventative health visit.       10/30/2023     9:19 AM   Additional Questions   Roomed by trisha carrera LPN   Accompanied by -       Healthy Habits:     Getting at least 3 servings of Calcium per day:  Yes    Bi-annual eye exam:  NO    Dental care twice a year:  Yes    Sleep apnea or symptoms of sleep apnea:  Sleep apnea    Diet:  Carbohydrate counting and Breakfast skipped    Frequency of exercise:  2-3 days/week    Duration of exercise:  15-30 minutes    Taking medications regularly:  Yes    Medication side effects:  Not applicable    Additional concerns today:  No      Erick presents to the clinic today for a preventive health examination.    In reviewing his chart the recent concern has been urinary problems.  He has noticed a decreased urinary stream.  He also had recurrent urinary tract infections over the past year including E. coli, Enterococcus, and Klebsiella.  He has required treatment with antibiotics.  He will be referred to urology.    He has had an increase in knee pain.  This can be worse with movement.    Additionally, he has had some ankle swelling bilaterally.  He can be mildly short of breath at times but admits he does not get regular aerobic exercise.  He can have occasional tingling in his legs.    He continues to work as a professor.    Family history is reviewed and notable for a brother who had colon cancer.  His last colonoscopy was in November 2019      Social History     Tobacco Use    Smoking status: Never    Smokeless tobacco: Never   Substance Use Topics    Alcohol use: Never             10/30/2023     9:06 AM   Alcohol Use   Prescreen: >3 drinks/day or >7 drinks/week? No          No data to display                Last PSA: No results found for: \"PSA\"    Reviewed orders with patient. Reviewed health maintenance and updated orders accordingly - Yes  Patient Active Problem List   Diagnosis    Family history of colon cancer    " Obesity, Class III, BMI 40-49.9 (morbid obesity) (H)    Umbilical hernia without obstruction and without gangrene    Tricompartment osteoarthritis of both knees     Past Surgical History:   Procedure Laterality Date    MENISCECTOMY Bilateral     ORTHOPEDIC SURGERY      orthoscopic knee       Social History     Tobacco Use    Smoking status: Never    Smokeless tobacco: Never   Substance Use Topics    Alcohol use: Never     Family History   Problem Relation Age of Onset    Colon Cancer Brother 49.00    Colon Cancer Maternal Uncle     Diabetes Type 1 Daughter          No current outpatient medications on file.     No Known Allergies    Reviewed and updated as needed this visit by clinical staff   Tobacco  Allergies  Meds              Reviewed and updated as needed this visit by Provider                 Past Medical History:   Diagnosis Date    Active rheumatic fever     was on PCN for 2 years in [employer]    Sleep apnea       Past Surgical History:   Procedure Laterality Date    MENISCECTOMY Bilateral     ORTHOPEDIC SURGERY      orthoscopic knee       Review of Systems   Constitutional:  Negative for chills and fever.   HENT:  Negative for congestion, ear pain, hearing loss and sore throat.    Eyes:  Negative for pain and visual disturbance.   Respiratory:  Negative for cough and shortness of breath.    Cardiovascular:  Negative for chest pain and palpitations.   Gastrointestinal:  Positive for constipation. Negative for abdominal pain, diarrhea and nausea.   Genitourinary:  Positive for frequency and urgency. Negative for dysuria, genital sores and hematuria.   Musculoskeletal:  Positive for arthralgias and joint swelling. Negative for myalgias.   Skin:  Negative for rash.   Neurological:  Negative for dizziness, weakness and headaches.   Psychiatric/Behavioral:  The patient is nervous/anxious.          OBJECTIVE:   /85 (BP Location: Left arm, Patient Position: Sitting, Cuff Size: Adult Large)   Pulse 85    "Temp 98.5  F (36.9  C) (Oral)   Resp 20   Ht 1.778 m (5' 10\")   Wt (!) 154 kg (339 lb 9.6 oz)   SpO2 100%   BMI 48.73 kg/m      Physical Exam  GENERAL: healthy, alert and no distress  EYES: Eyes grossly normal to inspection, PERRL and conjunctivae and sclerae normal  HENT: ear canals and TM's normal, nose and mouth without ulcers or lesions  NECK: no adenopathy, no asymmetry, masses, or scars and thyroid normal to palpation  RESP: lungs clear to auscultation - no rales, rhonchi or wheezes  CV: regular rate and rhythm, normal S1 S2, no S3 or S4, no murmur, click or rub, no peripheral edema and peripheral pulses strong  ABDOMEN: soft, nontender  MS: no gross musculoskeletal defects noted, no edema  SKIN: no suspicious lesions or rashes  NEURO: Normal strength and tone, mentation intact and speech normal  PSYCH: mentation appears normal, affect normal/bright    Diagnostic Test Results:  Labs reviewed in Epic    ASSESSMENT/PLAN:   Erick was seen today for physical.    Diagnoses and all orders for this visit:    Routine physical examination      -     Lipid panel reflex to direct LDL Fasting; Future  -     CBC with platelets; Future  -     PSA, screen; Future  -     Lipid panel reflex to direct LDL Fasting  -     CBC with platelets  -     PSA, screen    Obesity, Class III, BMI 40-49.9 (morbid obesity) (H)    Discussed the importance of dietary and lifestyle changes  He will consider following up with Dr. Mandujano to review weight loss options    Tricompartment osteoarthritis of both knees    Encourage follow-up with orthopedics if this remains problematic   Encourage weight loss      Elevated glucose    Recommend limiting carbohydrates in his diet  Check laboratory testing    -     Cancel: Hemoglobin A1c; Future  -     Hemoglobin A1c; Future  -     Hemoglobin A1c    Peripheral edema    May be secondary to chronic venous sufficiency due to his weight  Check laboratory testing as noted  Recommend leg elevation and " compression stockings  Encourage weight loss  If having any ongoing symptoms of concern consider an echocardiogram for further evaluation    -     Basic metabolic panel; Future  -     Basic metabolic panel    Numbness and tingling of both legs    Check laboratory testing  Consider further evaluation with an electromyelogram in the future    -     TSH with free T4 reflex; Future  -     TSH with free T4 reflex    Recurrent UTI    Check urinalysis  Given recurrent urinary tract infections as well as hematuria and decreased urinary stream recommend follow-up with urology  Recommend a prostate examination with them as well    -     Cancel: Adult Urology  Referral; Future  -     Adult Urology  Referral; Future  -     UA Macroscopic with reflex to Microscopic and Culture; Future  -     UA Macroscopic with reflex to Microscopic and Culture  -     UA Microscopic with Reflex to Culture    Decreased urine stream    Refer to urology for a prostate evaluation as noted  -     Cancel: Adult Urology  Referral; Future  -     Adult Urology  Referral; Future  -     UA Macroscopic with reflex to Microscopic and Culture; Future  -     UA Macroscopic with reflex to Microscopic and Culture  -     UA Microscopic with Reflex to Culture    Shortness of breath    Secondary to deconditioning  Check laboratory testing to rule out anemia  Check a BNP.  If abnormal then check an echocardiogram    -     BNP-N terminal pro; Future  -     BNP-N terminal pro    Other orders  -     COVID-19 12+ (2023-24) (PFIZER)  -     INFLUENZA VACCINE >6 MONTHS (AFLURIA/FLUZONE)  -     ZOSTER RECOMBINANT ADJUVANTED (SHINGRIX)  -     Cancel: PRIMARY CARE FOLLOW-UP SCHEDULING; Future  -     PRIMARY CARE FOLLOW-UP SCHEDULING; Future        Patient has been advised of split billing requirements and indicates understanding: Yes      COUNSELING:   Reviewed preventive health counseling, as reflected in patient instructions       Regular  "exercise       Healthy diet/nutrition       Alcohol Use        Colorectal cancer screening       Prostate cancer screening      BMI:   Estimated body mass index is 48.73 kg/m  as calculated from the following:    Height as of this encounter: 1.778 m (5' 10\").    Weight as of this encounter: 154 kg (339 lb 9.6 oz).   Weight management plan: Discussed healthy diet and exercise guidelines      He reports that he has never smoked. He has never used smokeless tobacco.            Michael Galicia MD  [facility]  Answers submitted by the patient for this visit:  Annual Preventive Visit (Submitted on 10/30/2023)  Chief Complaint: Annual Exam:  Blood in stool: No  heartburn: No  peripheral edema: Yes  mood changes: No  Skin sensation changes: No  impotence: Yes  penile discharge: No    "

## 2023-10-30 NOTE — PATIENT INSTRUCTIONS
Erick,    We will check your urine test today  I do want you to follow-up with urology given your urinary concerns including a decreased urinary stream as well as the recurrent hematuria which is blood in your urine  We will check a PSA and they may do the prostate check  We will check laboratory testing  We did discuss that you can consider seeing Dr. Madeleine Mandujano for her weight loss program  You received the COVID shot today  You also received a shingles vaccine.  A booster is due in 2-6 months  You consider compression stockings  Leg elevation can be helpful  If having ongoing knee pain then follow-up with orthopedic specialist is a good idea  If you develop an increase in numbness and tingling then we can consider having you follow-up with neurology as they can do a test to assess the numbness and tingling  If having more shortness of breath we can consider an echocardiogram/ultrasound of the heart    Michael Galicia MD      Preventive Health Recommendations  Male Ages 50 - 64    Yearly exam:             See your health care provider every year in order to  o   Review health changes.   o   Discuss preventive care.    o   Review your medicines if your doctor has prescribed any.   Have a cholesterol test every 5 years, or more frequently if you are at risk for high cholesterol/heart disease.   Have a diabetes test (fasting glucose) every three years. If you are at risk for diabetes, you should have this test more often.   Have a colonoscopy at age 45, or have a yearly FIT test (stool test). These exams will check for colon cancer.    Talk with your health care provider about whether or not a prostate cancer screening test (PSA) is right for you.  You should be tested each year for STDs (sexually transmitted diseases), if you re at risk.     Shots: Get a flu shot each year. Get a tetanus shot every 10 years.     Nutrition:  Eat at least 5 servings of fruits and vegetables daily.   Eat whole-grain bread,  whole-wheat pasta and brown rice instead of white grains and rice.   Get adequate Calcium and Vitamin D.     Lifestyle  Exercise for at least 150 minutes a week (30 minutes a day, 5 days a week). This will help you control your weight and prevent disease.   Limit alcohol to one drink per day.   No smoking.   Wear sunscreen to prevent skin cancer.   See your dentist every six months for an exam and cleaning.   See your eye doctor every 1 to 2 years.

## 2023-10-31 LAB
ANION GAP SERPL CALCULATED.3IONS-SCNC: 11 MMOL/L (ref 7–15)
BUN SERPL-MCNC: 13.7 MG/DL (ref 8–23)
CALCIUM SERPL-MCNC: 9.2 MG/DL (ref 8.8–10.2)
CHLORIDE SERPL-SCNC: 105 MMOL/L (ref 98–107)
CHOLEST SERPL-MCNC: 189 MG/DL
CREAT SERPL-MCNC: 0.96 MG/DL (ref 0.51–1.17)
DEPRECATED HCO3 PLAS-SCNC: 25 MMOL/L (ref 22–29)
EGFRCR SERPLBLD CKD-EPI 2021: 89 ML/MIN/1.73M2
GLUCOSE SERPL-MCNC: 95 MG/DL (ref 70–99)
HDLC SERPL-MCNC: 47 MG/DL
LDLC SERPL CALC-MCNC: 111 MG/DL
NONHDLC SERPL-MCNC: 142 MG/DL
NT-PROBNP SERPL-MCNC: <36 PG/ML (ref 0–900)
POTASSIUM SERPL-SCNC: 4.5 MMOL/L (ref 3.4–5.3)
PSA SERPL DL<=0.01 NG/ML-MCNC: 0.3 NG/ML (ref 0–4.5)
SODIUM SERPL-SCNC: 141 MMOL/L (ref 135–145)
TRIGL SERPL-MCNC: 155 MG/DL
TSH SERPL DL<=0.005 MIU/L-ACNC: 1.45 UIU/ML (ref 0.3–4.2)

## 2023-11-29 ENCOUNTER — OFFICE VISIT (OUTPATIENT)
Dept: FAMILY MEDICINE | Facility: CLINIC | Age: 63
End: 2023-11-29
Payer: COMMERCIAL

## 2023-11-29 VITALS
TEMPERATURE: 98.7 F | RESPIRATION RATE: 20 BRPM | DIASTOLIC BLOOD PRESSURE: 84 MMHG | OXYGEN SATURATION: 95 % | HEART RATE: 89 BPM | SYSTOLIC BLOOD PRESSURE: 142 MMHG

## 2023-11-29 DIAGNOSIS — R39.9 UTI SYMPTOMS: ICD-10-CM

## 2023-11-29 DIAGNOSIS — N39.0 URINARY TRACT INFECTION WITHOUT HEMATURIA, SITE UNSPECIFIED: Primary | ICD-10-CM

## 2023-11-29 LAB
ALBUMIN UR-MCNC: 100 MG/DL
APPEARANCE UR: CLEAR
BACTERIA #/AREA URNS HPF: ABNORMAL /HPF
BILIRUB UR QL STRIP: NEGATIVE
COLOR UR AUTO: YELLOW
GLUCOSE UR STRIP-MCNC: NEGATIVE MG/DL
HGB UR QL STRIP: ABNORMAL
KETONES UR STRIP-MCNC: NEGATIVE MG/DL
LEUKOCYTE ESTERASE UR QL STRIP: ABNORMAL
MUCOUS THREADS #/AREA URNS LPF: PRESENT /LPF
NITRATE UR QL: NEGATIVE
PH UR STRIP: 5.5 [PH] (ref 5–8)
RBC #/AREA URNS AUTO: ABNORMAL /HPF
SP GR UR STRIP: 1.02 (ref 1–1.03)
SQUAMOUS #/AREA URNS AUTO: ABNORMAL /LPF
UROBILINOGEN UR STRIP-ACNC: 0.2 E.U./DL
WBC #/AREA URNS AUTO: ABNORMAL /HPF

## 2023-11-29 PROCEDURE — 87088 URINE BACTERIA CULTURE: CPT | Performed by: FAMILY MEDICINE

## 2023-11-29 PROCEDURE — 99214 OFFICE O/P EST MOD 30 MIN: CPT | Performed by: FAMILY MEDICINE

## 2023-11-29 PROCEDURE — 87186 SC STD MICRODIL/AGAR DIL: CPT | Performed by: FAMILY MEDICINE

## 2023-11-29 PROCEDURE — 87086 URINE CULTURE/COLONY COUNT: CPT | Performed by: FAMILY MEDICINE

## 2023-11-29 PROCEDURE — 81001 URINALYSIS AUTO W/SCOPE: CPT | Performed by: FAMILY MEDICINE

## 2023-11-29 RX ORDER — SULFAMETHOXAZOLE/TRIMETHOPRIM 800-160 MG
1 TABLET ORAL 2 TIMES DAILY
Qty: 14 TABLET | Refills: 0 | Status: SHIPPED | OUTPATIENT
Start: 2023-11-29 | End: 2023-12-05

## 2023-11-29 NOTE — PROGRESS NOTES
Assessment:       Urinary tract infection without hematuria, site unspecified  - sulfamethoxazole-trimethoprim (BACTRIM DS) 800-160 MG tablet  Dispense: 14 tablet; Refill: 0    UTI symptoms  - UA Macroscopic with reflex to Microscopic and Culture - Clinic Collect  - UA Microscopic with Reflex to Culture  - Urine Culture    Plan:     Patient here today with difficulty with urination and increased frequency, suspect early UTI.  Patient has had 3 UTIs now in the past year with Enterococcus, E. coli, and Klebsiella.  Given recurrence of UTIs recommend that he follow-up with urology after he gets back from his trip.  Given that he is leaving for [place] today we will go ahead and treat empirically with Bactrim.  Push fluids.  Follow-up if symptoms getting worse or not improving as expected.  Urine culture pending and will adjust antibiotics as needed based on culture and sensitivities.    MEDICATIONS:   Orders Placed This Encounter   Medications    sulfamethoxazole-trimethoprim (BACTRIM DS) 800-160 MG tablet     Sig: Take 1 tablet by mouth 2 times daily for 7 days     Dispense:  14 tablet     Refill:  0       Subjective:       63 year old adult presents for evaluation after he woke up this morning with some difficulty with urination and increased urinary frequency.  He has had 3 urinary tract infections in the past year and states that his symptoms feel similar.  He leaving later this afternoon for [place].  Denies blood in his urine or any fevers or chills.  Previous UTIs positive for Enterococcus, E. coli, and Klebsiella in the past year.    Patient Active Problem List   Diagnosis    Family history of colon cancer    Obesity, Class III, BMI 40-49.9 (morbid obesity) (H)    Umbilical hernia without obstruction and without gangrene    Tricompartment osteoarthritis of both knees       Past Medical History:   Diagnosis Date    Active rheumatic fever     was on PCN for 2 years in [place]    Sleep apnea        Past Surgical  History:   Procedure Laterality Date    MENISCECTOMY Bilateral     ORTHOPEDIC SURGERY      orthoscopic knee       Current Outpatient Medications   Medication    sulfamethoxazole-trimethoprim (BACTRIM DS) 800-160 MG tablet     No current facility-administered medications for this visit.       No Known Allergies    Family History   Problem Relation Age of Onset    Colon Cancer Brother 49.00    Colon Cancer Maternal Uncle     Diabetes Type 1 Daughter        Social History     Socioeconomic History    Marital status:      Spouse name: None    Number of children: None    Years of education: None    Highest education level: None   Tobacco Use    Smoking status: Never    Smokeless tobacco: Never   Substance and Sexual Activity    Alcohol use: Never    Drug use: Never   Social History Narrative    , lives with wife, has 3 daughters age 36 (Corina), Adelaida (20), and Jillian (12)  Granddaughter 13      Social Determinants of Health     Financial Resource Strain: Low Risk  (10/30/2023)    Financial Resource Strain     Within the past 12 months, have you or your family members you live with been unable to get utilities (heat, electricity) when it was really needed?: No   Food Insecurity: Low Risk  (10/30/2023)    Food Insecurity     Within the past 12 months, did you worry that your food would run out before you got money to buy more?: No     Within the past 12 months, did the food you bought just not last and you didn t have money to get more?: No   Transportation Needs: Low Risk  (10/30/2023)    Transportation Needs     Within the past 12 months, has lack of transportation kept you from medical appointments, getting your medicines, non-medical meetings or appointments, work, or from getting things that you need?: No   Interpersonal Safety: Low Risk  (10/30/2023)    Interpersonal Safety     Do you feel physically and emotionally safe where you currently live?: Yes     Within the past 12 months, have you been hit,  slapped, kicked or otherwise physically hurt by someone?: No     Within the past 12 months, have you been humiliated or emotionally abused in other ways by your partner or ex-partner?: No   Housing Stability: Low Risk  (10/30/2023)    Housing Stability     Do you have housing? : Yes     Are you worried about losing your housing?: No         Review of Systems  Pertinent items are noted in HPI.      Objective:     BP (!) 142/84   Pulse 89   Temp 98.7  F (37.1  C) (Oral)   Resp 20   SpO2 95%      General appearance: alert, appears stated age, and cooperative  Back: No CVA tenderness  Abdomen: soft, non-tender; bowel sounds normal; no masses,  no organomegaly  Skin: Skin color, texture, turgor normal. No rashes or lesions       Results for orders placed or performed in visit on 11/29/23   UA Macroscopic with reflex to Microscopic and Culture - Clinic Collect     Status: Abnormal    Specimen: Urine, Midstream   Result Value Ref Range    Color Urine Yellow Colorless, Straw, Light Yellow, Yellow    Appearance Urine Clear Clear    Glucose Urine Negative Negative mg/dL    Bilirubin Urine Negative Negative    Ketones Urine Negative Negative mg/dL    Specific Gravity Urine 1.020 1.005 - 1.030    Blood Urine Small (A) Negative    pH Urine 5.5 5.0 - 8.0    Protein Albumin Urine 100 (A) Negative mg/dL    Urobilinogen Urine 0.2 0.2, 1.0 E.U./dL    Nitrite Urine Negative Negative    Leukocyte Esterase Urine Small (A) Negative   UA Microscopic with Reflex to Culture     Status: Abnormal   Result Value Ref Range    Bacteria Urine None Seen None Seen /HPF    RBC Urine 10-25 (A) 0-2 /HPF /HPF    WBC Urine 10-25 (A) 0-5 /HPF /HPF    Squamous Epithelials Urine None Seen None Seen /LPF    Mucus Urine Present (A) None Seen /LPF       This note has been dictated using voice recognition software. Any grammatical or context distortions are unintentional and inherent to the software

## 2023-11-30 LAB — BACTERIA UR CULT: ABNORMAL

## 2023-12-01 ENCOUNTER — TELEPHONE (OUTPATIENT)
Dept: FAMILY MEDICINE | Facility: CLINIC | Age: 63
End: 2023-12-01
Payer: COMMERCIAL

## 2023-12-01 DIAGNOSIS — N39.0 URINARY TRACT INFECTION WITHOUT HEMATURIA, SITE UNSPECIFIED: Primary | ICD-10-CM

## 2023-12-01 RX ORDER — NITROFURANTOIN 25; 75 MG/1; MG/1
100 CAPSULE ORAL 2 TIMES DAILY
Qty: 10 CAPSULE | Refills: 0 | Status: SHIPPED | OUTPATIENT
Start: 2023-12-01 | End: 2023-12-06

## 2023-12-01 NOTE — TELEPHONE ENCOUNTER
General Call    Contacts         Type Contact Phone/Fax    12/01/2023 01:54 PM CST Phone (Incoming) Erick Mathews (Self) [phone] (M)          Reason for Call:  patient called in and stated he is in [place] right now and will be coming home this Sunday and [place]. He wants to check and see if this can be sent to a pharmacy near him. (I informed him that he would not be able to do this) still would like to speak to a RN about this and see what would be advised. Please see labs and notes.     Date of last appointment with provider: 10-    Could we send this information to you in "[employer] or would you prefer to receive a phone call?:   Patient would prefer a phone call   Okay to leave a detailed message?: Yes at Cell number on file:    Telephone Information:   [place] [phone]

## 2023-12-01 NOTE — TELEPHONE ENCOUNTER
I sent an e-visit but you certainly can call him.  He saw another doctor and the urine culture did show that a change of antibiotic would be appropriate.  We are not able to send prescriptions to [place].  He should make a change upon his return as directed by Dr. Ansari.

## 2023-12-05 ENCOUNTER — OFFICE VISIT (OUTPATIENT)
Dept: UROLOGY | Facility: CLINIC | Age: 63
End: 2023-12-05
Attending: FAMILY MEDICINE
Payer: COMMERCIAL

## 2023-12-05 ENCOUNTER — TELEPHONE (OUTPATIENT)
Dept: UROLOGY | Facility: CLINIC | Age: 63
End: 2023-12-05

## 2023-12-05 VITALS
HEART RATE: 91 BPM | BODY MASS INDEX: 45.1 KG/M2 | SYSTOLIC BLOOD PRESSURE: 148 MMHG | WEIGHT: 315 LBS | DIASTOLIC BLOOD PRESSURE: 88 MMHG | HEIGHT: 70 IN | OXYGEN SATURATION: 96 %

## 2023-12-05 DIAGNOSIS — N39.0 URINARY TRACT INFECTION WITHOUT HEMATURIA, SITE UNSPECIFIED: ICD-10-CM

## 2023-12-05 PROCEDURE — 99203 OFFICE O/P NEW LOW 30 MIN: CPT | Mod: 25 | Performed by: STUDENT IN AN ORGANIZED HEALTH CARE EDUCATION/TRAINING PROGRAM

## 2023-12-05 PROCEDURE — 51798 US URINE CAPACITY MEASURE: CPT | Performed by: STUDENT IN AN ORGANIZED HEALTH CARE EDUCATION/TRAINING PROGRAM

## 2023-12-05 ASSESSMENT — PAIN SCALES - GENERAL: PAINLEVEL: NO PAIN (0)

## 2023-12-05 NOTE — NURSING NOTE
Active order to obtain bladder scan? Yes   Name of ordering provider:  Tea Jhaveri  Bladder scan preformed post void Yes:  Bladder scan reveled 16 ML  Provider notified?  Yes    Jessica Palacio LPN

## 2023-12-05 NOTE — PROGRESS NOTES
"        Chief Complaint:   Frequent UTIs         History of Present Illness:   Som Mathews is a 63 year old adult with a history of umbilical hernia and osteoarthritis who presents for evaluation of frequent UTIs.     The patient reports 3-4 UTIs in the last few years. He reports suprapubic pressure, weak stream, and urinary frequency and urgency. He denies significant dysuria. Urine culture from 11/29/2023 grew 10-50,000 CFUs Enterococcus faecalis. He was originally started on Bactrim and changed to Macrobid.     At baseline, he reports urinary frequency and urgency. He denies gross hematuria.     He reports regular bowel movements.     His PSA was 0.30 ng/mL on 10/30/2023.          Past Medical History:     Past Medical History:   Diagnosis Date    Active rheumatic fever     was on PCN for 2 years in [employer]    Sleep apnea             Past Surgical History:     Past Surgical History:   Procedure Laterality Date    MENISCECTOMY Bilateral     ORTHOPEDIC SURGERY      orthoscopic knee            Medications     Current Outpatient Medications   Medication    nitroFURantoin macrocrystal-monohydrate (MACROBID) 100 MG capsule     No current facility-administered medications for this visit.            Allergies:   Patient has no known allergies.         Review of Systems:  From intake questionnaire   Negative 14 system review except as noted on HPI, nurse's note.         Physical Exam:   Patient is a 63 year old  adult   Vitals: Blood pressure (!) 148/88, pulse 91, height 1.778 m (5' 10\"), weight (!) 153.8 kg (339 lb), SpO2 96%, not currently breastfeeding.  General Appearance Adult: Alert, no acute distress, oriented.  Lungs: Non-labored breathing.  Heart: No obvious jugular venous distension present.  Neuro: Alert, oriented, speech and mentation normal    PVR: 16 mL      Labs and Pathology:    I personally reviewed all applicable laboratory data and went over findings with patient  Significant for:    CBC " RESULTS:  Recent Labs   Lab Test 10/30/23  1034 11/21/22  0652 05/30/21  1247 12/28/18  1345   WBC 8.5 11.8* 12.5* 15.7*   HGB 15.2 14.4 13.6 14.4    250 242 232        BMP RESULTS:  Recent Labs   Lab Test 10/30/23  1034 08/10/23  1422 11/21/22  0652 05/30/21  1247 09/03/19  1420 12/28/18  1345    141 136 137  --  138   POTASSIUM 4.5 4.3 4.0 3.6  --  3.7   CHLORIDE 105 102 104 104  --  103   CO2 25 26 24 25  --  26   ANIONGAP 11 13 8 8  --  9   GLC 95 139* 115* 149*   < > 95   BUN 13.7 14.3 13 9  --  12   CR 0.96 0.99 1.12 1.01  --  0.97   GFRESTIMATED 89 86 74 80  --  86   GFRESTBLACK  --   --   --  >90  --  >90   JOSELINE 9.2 9.2 8.7 8.3*  --  8.6    < > = values in this interval not displayed.       UA RESULTS:   Recent Labs   Lab Test 11/29/23  1039 10/30/23  1052 07/01/23  1035   SG 1.020 1.025 1.025   URINEPH 5.5 5.5 5.5   NITRITE Negative Negative Negative   RBCU 10-25* 0-2 5-10*   WBCU 10-25* 0-5 *       PSA RESULTS  Prostate Specific Antigen Screen   Date Value Ref Range Status   10/30/2023 0.30 0.00 - 4.50 ng/mL Final            Assessment and Plan:     Assessment: 63 year old adult seen in evaluation for frequent UTIs. His most recent positive urine culture was on 11/29/2023. He reports some weak stream, urinary frequency, and urgency at baseline. His PVR today was 16 mL.     We discussed BPH as a possible cause of his urinary symptoms. We reviewed treatment options including observation, alpha blockers, 5-alpha reductase inhibitors, and bladder outlet surgery.  We discussed that since he has experienced several UTIs, we would recommend cystoscopy for further evaluation. He is agreeable. We discussed starting tamsulosin in the mean time, which he declined.     Plan:  Cystoscopy for evaluation of frequent UTIs in male.     MICHAEL BROWN PA-C  Department of Urology

## 2023-12-07 ENCOUNTER — TELEPHONE (OUTPATIENT)
Dept: UROLOGY | Facility: CLINIC | Age: 63
End: 2023-12-07
Payer: COMMERCIAL

## 2024-01-05 ENCOUNTER — OFFICE VISIT (OUTPATIENT)
Dept: UROLOGY | Facility: CLINIC | Age: 64
End: 2024-01-05
Payer: COMMERCIAL

## 2024-01-05 VITALS — BODY MASS INDEX: 44.1 KG/M2 | WEIGHT: 315 LBS | HEIGHT: 71 IN

## 2024-01-05 DIAGNOSIS — R35.0 BENIGN PROSTATIC HYPERPLASIA WITH URINARY FREQUENCY: ICD-10-CM

## 2024-01-05 DIAGNOSIS — N39.0 URINARY TRACT INFECTION WITHOUT HEMATURIA, SITE UNSPECIFIED: Primary | ICD-10-CM

## 2024-01-05 DIAGNOSIS — N40.1 BENIGN PROSTATIC HYPERPLASIA WITH URINARY FREQUENCY: ICD-10-CM

## 2024-01-05 PROCEDURE — 52000 CYSTOURETHROSCOPY: CPT | Performed by: UROLOGY

## 2024-01-05 RX ORDER — TAMSULOSIN HYDROCHLORIDE 0.4 MG/1
0.4 CAPSULE ORAL DAILY
Qty: 90 CAPSULE | Refills: 3 | Status: SHIPPED | OUTPATIENT
Start: 2024-01-05

## 2024-01-05 RX ORDER — LIDOCAINE HYDROCHLORIDE 20 MG/ML
JELLY TOPICAL ONCE
Status: COMPLETED | OUTPATIENT
Start: 2024-01-05 | End: 2024-01-05

## 2024-01-05 RX ADMIN — LIDOCAINE HYDROCHLORIDE: 20 JELLY TOPICAL at 09:38

## 2024-01-05 ASSESSMENT — PAIN SCALES - GENERAL: PAINLEVEL: NO PAIN (0)

## 2024-01-05 NOTE — PATIENT INSTRUCTIONS
"Please schedule a virtual visit follow up with Simone Medel to go over symptoms of flomax.    It was a pleasure meeting with you today.  Thank you for allowing me and my team the privilege of caring for you today.  YOU are the reason we are here, and I truly hope we provided you with the excellent service you deserve.  Please let us know if there is anything else we can do for you so that we can be sure you are leaving completely satisfied with your care experience.        AFTER YOUR CYSTOSCOPY        You have just completed a cystoscopy, or \"cysto\", which allowed your physician to learn more about your bladder (or to remove a stent placed after surgery). We suggest that you continue to avoid caffeine, fruit juice, and alcohol for the next 24 hours, however, you are encouraged to return to your normal activities.         A few things that are considered normal after your cystoscopy:     * Small amount of bleeding (or spotting) that clears within the next 24 hours     * Slight burning sensation with urination     * Sensation to of needing to avoid more frequently     * The feeling of \"air\" in your urine     * Mild discomfort that is relieved with Tylenol        Please contact our office promptly if you:     * Develop a fever above 101 degrees     * Are unable to urinate     * Develop bright red blood that does not stop     * Severe pain or swelling         Please contact our office with any concerns or questions @[email or web address].  "

## 2024-01-05 NOTE — PROGRESS NOTES
Cystoscopy Note    PRE-PROCEDURE DIAGNOSIS:  LUTS  Recurrent UTIs    POST-PROCEDURE DIAGNOSIS:  LUTS  Recurrent UTIs    PROCEDURE:   Cystoscopy    HISTORY: Som Mathews is a 63 year old male with  3-4 UTIs in the last few years. He reports suprapubic pressure, weak stream, and urinary frequency and urgency. He denies significant dysuria. Urine culture from 11/29/2023 grew 10-50,000 CFUs Enterococcus faecalis. He was originally started on Bactrim and changed to Macrobid.      At baseline, he reports urinary frequency and urgency. He denies gross hematuria.     DESCRIPTION OF PROCEDURE:  After informed consent was obtained, the patient was brought to the procedure room where they were placed in the modified lithotomy position with all pressure points well padded.  The patient was prepped and draped in a sterile fashion. A flexible cystoscope was introduced through a well-lubricated urethra. There were no urethral strictures. The bladder was entered. The urine was clear. Systematic cystoscopy was performed. There were no tumors, stones, or other abnormalities in the bladder. Prostate was moderately enlarged. No median lobe.    ASSESSMENT AND PLAN:  BPH, recurrent UTIs.  Should take the Flomax he was previously prescribed.  Assess symptoms in 6-8 weeks.  If refractory symptoms then he should consider prostate procedure, then he can see a BPH surgeon.    Michael Su MD  Reconstructive Urology

## 2024-01-05 NOTE — NURSING NOTE
"Chief Complaint   Patient presents with    Cystoscopy       Height 1.803 m (5' 11\"), weight 145.2 kg (320 lb). Body mass index is 44.63 kg/m .    Patient Active Problem List   Diagnosis    Family history of colon cancer    Obesity, Class III, BMI 40-49.9 (morbid obesity) (H)    Umbilical hernia without obstruction and without gangrene    Tricompartment osteoarthritis of both knees       No Known Allergies    No current outpatient medications on file.       Social History     Tobacco Use    Smoking status: Never    Smokeless tobacco: Never   Substance Use Topics    Alcohol use: Never    Drug use: Never       Invasive Procedure Safety Checklist:    Procedure: Cystoscopy    Action: Complete sections and checkboxes as appropriate.    Pre-procedure:  1. Patient ID Verified with 2 identifiers (June and  or MRN) : YES    2. Procedure and site verified with patient/designee (when able) : YES    3. Accurate consent documentation in medical record : YES    4. H&P (or appropriate assessment) documented in medical record : N/A  H&P must be up to 30 days prior to procedure an updated within 24 hours of                 Procedure as applicable.     5. Relevant diagnostic and radiology test results appropriately labeled and displayed as applicable : YES    6. Blood products, implants, devices, and/or special equipment available for the procedure as applicable : YES    7. Procedure site(s) marked with provider initials [Exclusions: none] : NO    8. Marking not required. Reason : Yes  Procedure does not require site marking    Time Out:     Time-Out performed immediately prior to starting procedure, including verbal and active participation of all team members addressing: YES    1. Correct patient identity.  2. Confirmed that the correct side and site are marked.  3. An accurate procedure to be done.  4. Agreement on the procedure to be done.  5. Correct patient position.  6. Relevant images and results are properly labeled and " appropriately displayed.  7. The need to administer antibiotics or fluids for irrigation purposes during the procedure as applicable.  8. Safety precautions based on patient history or medication use.    During Procedure: Verification of correct person, site, and procedure occurs any time the responsibility for care of the patient is transferred to another member of the care team.    The following medication was given:     MEDICATION:  Lidocaine without epinephrine 2% jelly  ROUTE: urethral   SITE: urethral   DOSE: 10 mL  LOT #: [id number]  : [employer]  EXPIRATION DATE: 6-25  NDC#: [address]-8   Was there drug waste? No    Prior to med admin, verified patient identity using patient's name and date of birth.  Due to med administration, patient instructed to remain in clinic for 15 minutes  afterwards, and to report any adverse reaction to me immediately.    Drug Amount Wasted:  None.  Vial/Syringe: Syringe      Renetta Ojeda  1/5/2024  9:37 AM

## 2024-01-05 NOTE — LETTER
1/5/2024       RE: Som Mathews  [address]  [facility] [address]     Dear Colleague,    Thank you for referring your patient, Som Mathews, to the [facility] [place] [facility]. Please see a copy of my visit note below.    Cystoscopy Note    PRE-PROCEDURE DIAGNOSIS:  LUTS  Recurrent UTIs    POST-PROCEDURE DIAGNOSIS:  LUTS  Recurrent UTIs    PROCEDURE:   Cystoscopy    HISTORY: Som Mathews is a 63 year old male with  3-4 UTIs in the last few years. He reports suprapubic pressure, weak stream, and urinary frequency and urgency. He denies significant dysuria. Urine culture from 11/29/2023 grew 10-50,000 CFUs Enterococcus faecalis. He was originally started on Bactrim and changed to Macrobid.      At baseline, he reports urinary frequency and urgency. He denies gross hematuria.     DESCRIPTION OF PROCEDURE:  After informed consent was obtained, the patient was brought to the procedure room where they were placed in the modified lithotomy position with all pressure points well padded.  The patient was prepped and draped in a sterile fashion. A flexible cystoscope was introduced through a well-lubricated urethra. There were no urethral strictures. The bladder was entered. The urine was clear. Systematic cystoscopy was performed. There were no tumors, stones, or other abnormalities in the bladder. Prostate was moderately enlarged. No median lobe.    ASSESSMENT AND PLAN:  BPH, recurrent UTIs.  Should take the Flomax he was previously prescribed.  Assess symptoms in 6-8 weeks.  If refractory symptoms then he should consider prostate procedure, then he can see a BPH surgeon.    Michael Su MD  Reconstructive Urology

## 2024-01-25 NOTE — PROGRESS NOTES
Consult conducted via real-time audio/video technology by Trey Medel PA-C to the patient in their home. Patient consented to this billed visit that was started at 3:47 PM and completed at 3:58 PM.    REASON FOR VISIT  BPH and UTIs follow-up    HISTORY OF PRESENT ILLNESS  Mr. Mathews is a 63 year old male who presents today in follow-up for his previously discovered UTIs.  He recently underwent a cystoscopy with Dr. Su for evaluation of this and was found to haveer a moderately enlarged prostate with no evidence of median lobe.  Dr. Su ultimately recommended following through with the trial of Flomax previously recommended with follow-up 6 to 8 weeks later.     Today:  States that while he has not had a urinary tract infection since starting the Flomax a couple weeks ago, he also does not feel that it is done a ton  At most, feels maybe he is not getting up as often to go to the bathroom and may be his stream is slightly stronger  Does specifically deny any side effects of the Flomax  Notes that he has been making a dedicated effort to increase how much he is drinking during the day and urinating more often     PHYSICAL EXAMINATION  Deferred given virtual visit.    IMAGING  No upper tract imaging    IMPRESSION  Recurrent urinary tract infections  BPH seen on cystoscopy without evidence of elevated postvoid residual    It was my pleasure to speak with Erick in follow-up for his BPH seen on cystoscopy as well as his recurrent urinary tract infections.  After reviewing his clinical history, I am glad to hear that he is not having any side effects from the Flomax, but I am sorry to hear he does not feel it is making a huge difference in his urination.  We discussed that I think the lifestyle modifications of drinking more and using the restroom more often will be most impactful in reducing his number of infections as the majority of his infections historically have been associated with low fluid intake and  long periods of time without going to the bathroom.    I do believe is reasonable for him to continue on the Flomax as this medication will just be helpful in the future as long as he is not having any side effects.  His primary care could refill this medication and if they are comfortable doing so means he could follow-up with our department as needed.  However, if his primary care would feel more comfortable with us renewing the medication we would need to see him back once a year and he could follow-up with [facility] who he saw initially.    Mr. Mathews expressed understanding and agreement to the above discussion and plan and all of his questions were answered to his satisfaction.      PLAN  Continue on Flomax 0.4 mg  Fluid goal of 60-70 oz per day, drink until urine is clear or light yellow all the time  Follow-up with urology as needed or yearly if primary care is uncomfortable refilling Flomax    SIGNED    Trey Medel PA-C      I spent a total of 15 minutes spent on the date of the encounter doing chart review, history and exam, documentation, and further activities as noted above.

## 2024-01-26 ENCOUNTER — VIRTUAL VISIT (OUTPATIENT)
Dept: UROLOGY | Facility: CLINIC | Age: 64
End: 2024-01-26
Payer: COMMERCIAL

## 2024-01-26 DIAGNOSIS — N39.0 URINARY TRACT INFECTION WITHOUT HEMATURIA, SITE UNSPECIFIED: Primary | ICD-10-CM

## 2024-01-26 DIAGNOSIS — N40.1 BENIGN PROSTATIC HYPERPLASIA WITH URINARY FREQUENCY: ICD-10-CM

## 2024-01-26 DIAGNOSIS — R35.0 BENIGN PROSTATIC HYPERPLASIA WITH URINARY FREQUENCY: ICD-10-CM

## 2024-01-26 PROCEDURE — 99212 OFFICE O/P EST SF 10 MIN: CPT | Mod: 95 | Performed by: STUDENT IN AN ORGANIZED HEALTH CARE EDUCATION/TRAINING PROGRAM

## 2024-01-26 ASSESSMENT — PAIN SCALES - GENERAL: PAINLEVEL: NO PAIN (0)

## 2024-01-26 NOTE — LETTER
1/26/2024       RE: Som Mathews  [address]  [facility] [address]     Dear Colleague,    Thank you for referring your patient, Som Mathews, to the [facility] [place] at [facility]. Please see a copy of my visit note below.    Consult conducted via real-time audio/video technology by Trey Medel PA-C to the patient in their home. Patient consented to this billed visit that was started at 3:47 PM and completed at 3:58 PM.    REASON FOR VISIT  BPH and UTIs follow-up    HISTORY OF PRESENT ILLNESS  Mr. Mathews is a 63 year old male who presents today in follow-up for his previously discovered UTIs.  He recently underwent a cystoscopy with Dr. Su for evaluation of this and was found to haveer a moderately enlarged prostate with no evidence of median lobe.  Dr. Su ultimately recommended following through with the trial of Flomax previously recommended with follow-up 6 to 8 weeks later.     Today:  States that while he has not had a urinary tract infection since starting the Flomax a couple weeks ago, he also does not feel that it is done a ton  At most, feels maybe he is not getting up as often to go to the bathroom and may be his stream is slightly stronger  Does specifically deny any side effects of the Flomax  Notes that he has been making a dedicated effort to increase how much he is drinking during the day and urinating more often     PHYSICAL EXAMINATION  Deferred given virtual visit.    IMAGING  No upper tract imaging    IMPRESSION  Recurrent urinary tract infections  BPH seen on cystoscopy without evidence of elevated postvoid residual    It was my pleasure to speak with Erick in follow-up for his BPH seen on cystoscopy as well as his recurrent urinary tract infections.  After reviewing his clinical history, I am glad to hear that he is not having any side effects from the Flomax, but I am sorry to hear he does not feel  it is making a huge difference in his urination.  We discussed that I think the lifestyle modifications of drinking more and using the restroom more often will be most impactful in reducing his number of infections as the majority of his infections historically have been associated with low fluid intake and long periods of time without going to the bathroom.    I do believe is reasonable for him to continue on the Flomax as this medication will just be helpful in the future as long as he is not having any side effects.  His primary care could refill this medication and if they are comfortable doing so means he could follow-up with our department as needed.  However, if his primary care would feel more comfortable with us renewing the medication we would need to see him back once a year and he could follow-up with [facility] who he saw initially.    Mr. Mathews expressed understanding and agreement to the above discussion and plan and all of his questions were answered to his satisfaction.      PLAN  Continue on Flomax 0.4 mg  Fluid goal of 60-70 oz per day, drink until urine is clear or light yellow all the time  Follow-up with urology as needed or yearly if primary care is uncomfortable refilling Flomax    SIGNED    Trey Medel PA-C      I spent a total of 15 minutes spent on the date of the encounter doing chart review, history and exam, documentation, and further activities as noted above.    Virtual Visit Details    Type of service:  Video Visit     Originating Location (pt. Location): Home    Distant Location (provider location):  Off-site  Platform used for Video Visit: Alpesh

## 2024-01-26 NOTE — PROGRESS NOTES
Virtual Visit Details    Type of service:  Video Visit     Originating Location (pt. Location): Home    Distant Location (provider location):  Off-site  Platform used for Video Visit: Alpesh

## 2024-01-26 NOTE — NURSING NOTE
Is the patient currently in the state of [place]? YES    Visit mode:VIDEO    If the visit is dropped, the patient can be reconnected by: VIDEO VISIT: Send to e-mail at: [email or web address]    Will anyone else be joining the visit? NO  (If patient encounters technical issues they should call [phone]    How would you like to obtain your AVS? MyChart    Are changes needed to the allergy or medication list? No    Reason for visit: RECHECK    Kalina MALIN

## 2024-09-30 ENCOUNTER — PATIENT OUTREACH (OUTPATIENT)
Dept: CARE COORDINATION | Facility: CLINIC | Age: 64
End: 2024-09-30
Payer: COMMERCIAL

## 2024-12-01 ENCOUNTER — HEALTH MAINTENANCE LETTER (OUTPATIENT)
Age: 64
End: 2024-12-01

## 2025-08-12 ENCOUNTER — TELEPHONE (OUTPATIENT)
Dept: AUDIOLOGY | Facility: CLINIC | Age: 65
End: 2025-08-12
Payer: COMMERCIAL

## (undated) RX ORDER — LIDOCAINE HYDROCHLORIDE 20 MG/ML
JELLY TOPICAL
Status: DISPENSED
Start: 2024-01-05